# Patient Record
Sex: FEMALE | Race: WHITE | NOT HISPANIC OR LATINO | Employment: STUDENT | URBAN - METROPOLITAN AREA
[De-identification: names, ages, dates, MRNs, and addresses within clinical notes are randomized per-mention and may not be internally consistent; named-entity substitution may affect disease eponyms.]

---

## 2017-05-04 ENCOUNTER — PATIENT MESSAGE (OUTPATIENT)
Dept: INTERNAL MEDICINE | Facility: CLINIC | Age: 22
End: 2017-05-04

## 2017-05-08 ENCOUNTER — TELEPHONE (OUTPATIENT)
Dept: INTERNAL MEDICINE | Facility: CLINIC | Age: 22
End: 2017-05-08

## 2017-05-08 NOTE — TELEPHONE ENCOUNTER
Lm for pt to call back, pt booked apt for 5/22/17      Existing Patient      I have been having headaches that are accompanied with      spotty vision, tingly hands, and nausea     Called to offer sooner available time.

## 2017-05-22 ENCOUNTER — HOSPITAL ENCOUNTER (OUTPATIENT)
Dept: RADIOLOGY | Facility: HOSPITAL | Age: 22
Discharge: HOME | End: 2017-05-22
Attending: INTERNAL MEDICINE
Payer: COMMERCIAL

## 2017-05-22 ENCOUNTER — OFFICE VISIT (OUTPATIENT)
Dept: INTERNAL MEDICINE | Facility: CLINIC | Age: 22
End: 2017-05-22
Payer: COMMERCIAL

## 2017-05-22 VITALS
HEIGHT: 62 IN | HEART RATE: 90 BPM | SYSTOLIC BLOOD PRESSURE: 108 MMHG | DIASTOLIC BLOOD PRESSURE: 73 MMHG | RESPIRATION RATE: 16 BRPM | TEMPERATURE: 99 F | WEIGHT: 168.19 LBS | BODY MASS INDEX: 30.95 KG/M2

## 2017-05-22 DIAGNOSIS — M54.2 NECK PAIN: ICD-10-CM

## 2017-05-22 DIAGNOSIS — R51.9 NONINTRACTABLE HEADACHE, UNSPECIFIED CHRONICITY PATTERN, UNSPECIFIED HEADACHE TYPE: Primary | ICD-10-CM

## 2017-05-22 DIAGNOSIS — R20.0 NUMBNESS AND TINGLING IN BOTH HANDS: ICD-10-CM

## 2017-05-22 DIAGNOSIS — R51.9 NONINTRACTABLE HEADACHE, UNSPECIFIED CHRONICITY PATTERN, UNSPECIFIED HEADACHE TYPE: ICD-10-CM

## 2017-05-22 DIAGNOSIS — H53.9 VISUAL DISTURBANCE: ICD-10-CM

## 2017-05-22 DIAGNOSIS — R20.2 NUMBNESS AND TINGLING IN BOTH HANDS: ICD-10-CM

## 2017-05-22 PROCEDURE — 72040 X-RAY EXAM NECK SPINE 2-3 VW: CPT | Mod: 26,,, | Performed by: RADIOLOGY

## 2017-05-22 PROCEDURE — 99999 PR PBB SHADOW E&M-EST. PATIENT-LVL IV: CPT | Mod: PBBFAC,,, | Performed by: INTERNAL MEDICINE

## 2017-05-22 PROCEDURE — 1160F RVW MEDS BY RX/DR IN RCRD: CPT | Mod: S$GLB,,, | Performed by: INTERNAL MEDICINE

## 2017-05-22 PROCEDURE — 99214 OFFICE O/P EST MOD 30 MIN: CPT | Mod: S$GLB,,, | Performed by: INTERNAL MEDICINE

## 2017-05-22 PROCEDURE — 72040 X-RAY EXAM NECK SPINE 2-3 VW: CPT | Mod: TC,PO

## 2017-05-22 RX ORDER — SUMATRIPTAN SUCCINATE 100 MG/1
100 TABLET ORAL
Qty: 18 TABLET | Refills: 11 | Status: SHIPPED | OUTPATIENT
Start: 2017-05-22 | End: 2017-08-22

## 2017-05-22 NOTE — PROGRESS NOTES
Subjective:       Patient ID: Yulissa Carias is a 21 y.o. female.    Chief Complaint: Headache; Numbness (in fingers); Other (spotty vision); and Nausea    Patient is a 21 y.o.female who presents today for a few concerning symptoms. She has been experiencing occasional headaches associated with spotty vision, tingling in her hands, and nausea.     First episode occurred in October in the morning and continued to the afternoon. She took a nap and it resolved. She was having trouble seeing; spotty vision and hard to focus. Then, her hands started going numb and then she got a headache. Then she felt weak and nauseated. She had a hard time texting her dad. She vomited once. Her symptoms fulled resolved after her nap.    Next one was in march. She started getting the same symptoms of spotty vision and numbness in her hands. She went straight to sleep and symptoms resolved.     The last one was beginning of May. It started in the afternoon with the same symptoms.     Her headache is mostly right sided and in her neck.     Review of Systems   Constitutional: Negative for unexpected weight change.   HENT: Negative for hearing loss, rhinorrhea and trouble swallowing.    Eyes: Positive for visual disturbance. Negative for discharge.   Respiratory: Negative for chest tightness and wheezing.    Cardiovascular: Negative for chest pain and palpitations.   Gastrointestinal: Positive for constipation and diarrhea. Negative for blood in stool and vomiting.   Endocrine: Negative for polydipsia and polyuria.   Genitourinary: Negative for difficulty urinating, dysuria, hematuria and menstrual problem.   Musculoskeletal: Negative for arthralgias and joint swelling.   Neurological: Positive for numbness and headaches. Negative for weakness.   Psychiatric/Behavioral: Negative for confusion and dysphoric mood.       Objective:      Physical Exam   Constitutional: She is oriented to person, place, and time. She appears well-developed  and well-nourished. No distress.   HENT:   Head: Normocephalic and atraumatic.   Right Ear: External ear normal.   Left Ear: External ear normal.   Eyes: Conjunctivae and EOM are normal. Pupils are equal, round, and reactive to light. Right eye exhibits no discharge. Left eye exhibits no discharge. No scleral icterus.   Neck: Normal range of motion. Neck supple. No JVD present. No thyromegaly present.   Cardiovascular: Normal rate, regular rhythm, normal heart sounds and intact distal pulses.  Exam reveals no gallop and no friction rub.    No murmur heard.  Pulmonary/Chest: Effort normal and breath sounds normal. No stridor. No respiratory distress. She has no wheezes. She has no rales. She exhibits no tenderness.   Abdominal: Soft. Bowel sounds are normal. She exhibits no distension. There is no tenderness. There is no rebound.   Musculoskeletal: Normal range of motion. She exhibits no edema or tenderness.   Lymphadenopathy:     She has no cervical adenopathy.   Neurological: She is alert and oriented to person, place, and time. No cranial nerve deficit.   Skin: Skin is warm and dry. No rash noted. She is not diaphoretic. No erythema.   Psychiatric: She has a normal mood and affect. Her behavior is normal.   Nursing note and vitals reviewed.      Assessment and Plan:       1. Nonintractable headache, unspecified chronicity pattern, unspecified headache type  - ocular migraine vs MS  - Ambulatory consult to Neurology  - MRI Brain W WO Contrast; Future  - X-Ray Cervical Spine AP And Lateral; Future  - Vitamin B12; Future  - Sedimentation rate, manual; Future  - High sensitivity CRP (Cardiac CRP); Future  - sumatriptan (IMITREX) 100 MG tablet; Take 1 tablet (100 mg total) by mouth every 2 (two) hours as needed for Migraine. No more than twice in a 24 hour period  Dispense: 18 tablet; Refill: 11    2. Numbness and tingling in both hands  - Ambulatory consult to Neurology  - MRI Brain W WO Contrast; Future  - X-Ray  Cervical Spine AP And Lateral; Future  - Vitamin B12; Future  - Sedimentation rate, manual; Future  - High sensitivity CRP (Cardiac CRP); Future    3. Neck pain  - Ambulatory consult to Neurology  - MRI Brain W WO Contrast; Future  - X-Ray Cervical Spine AP And Lateral; Future  - Vitamin B12; Future  - Sedimentation rate, manual; Future  - High sensitivity CRP (Cardiac CRP); Future    4. Visual disturbance  - Ambulatory consult to Neurology  - MRI Brain W WO Contrast; Future  - X-Ray Cervical Spine AP And Lateral; Future  - Vitamin B12; Future  - Sedimentation rate, manual; Future  - High sensitivity CRP (Cardiac CRP); Future    - pt declines pregnancy screen prior to xray  - etiology likely migraine vs MS  - Notify clinic if symptoms change, worsen or do not improve          No Follow-up on file.

## 2017-06-01 ENCOUNTER — HOSPITAL ENCOUNTER (OUTPATIENT)
Dept: RADIOLOGY | Facility: HOSPITAL | Age: 22
Discharge: HOME | End: 2017-06-01
Attending: INTERNAL MEDICINE
Payer: COMMERCIAL

## 2017-06-01 DIAGNOSIS — M54.2 NECK PAIN: ICD-10-CM

## 2017-06-01 DIAGNOSIS — R20.0 NUMBNESS AND TINGLING IN BOTH HANDS: ICD-10-CM

## 2017-06-01 DIAGNOSIS — R20.2 NUMBNESS AND TINGLING IN BOTH HANDS: ICD-10-CM

## 2017-06-01 DIAGNOSIS — R51.9 NONINTRACTABLE HEADACHE, UNSPECIFIED CHRONICITY PATTERN, UNSPECIFIED HEADACHE TYPE: ICD-10-CM

## 2017-06-01 DIAGNOSIS — H53.9 VISUAL DISTURBANCE: ICD-10-CM

## 2017-06-01 PROCEDURE — 70553 MRI BRAIN STEM W/O & W/DYE: CPT | Mod: 26,,, | Performed by: RADIOLOGY

## 2017-06-01 PROCEDURE — 25500020 PHARM REV CODE 255: Performed by: INTERNAL MEDICINE

## 2017-06-01 PROCEDURE — 70553 MRI BRAIN STEM W/O & W/DYE: CPT | Mod: TC

## 2017-06-01 PROCEDURE — A9585 GADOBUTROL INJECTION: HCPCS | Performed by: INTERNAL MEDICINE

## 2017-06-01 RX ORDER — GADOBUTROL 604.72 MG/ML
8 INJECTION INTRAVENOUS
Status: COMPLETED | OUTPATIENT
Start: 2017-06-01 | End: 2017-06-01

## 2017-06-01 RX ADMIN — GADOBUTROL 8 ML: 604.72 INJECTION INTRAVENOUS at 07:06

## 2017-06-29 ENCOUNTER — OFFICE VISIT (OUTPATIENT)
Dept: NEUROLOGY | Facility: CLINIC | Age: 22
End: 2017-06-29
Payer: COMMERCIAL

## 2017-06-29 VITALS
WEIGHT: 171.06 LBS | HEART RATE: 95 BPM | BODY MASS INDEX: 31.48 KG/M2 | DIASTOLIC BLOOD PRESSURE: 81 MMHG | SYSTOLIC BLOOD PRESSURE: 126 MMHG | HEIGHT: 62 IN

## 2017-06-29 DIAGNOSIS — G43.109 MIGRAINE WITH AURA AND WITHOUT STATUS MIGRAINOSUS, NOT INTRACTABLE: ICD-10-CM

## 2017-06-29 PROCEDURE — 99999 PR PBB SHADOW E&M-EST. PATIENT-LVL III: CPT | Mod: PBBFAC,,, | Performed by: PSYCHIATRY & NEUROLOGY

## 2017-06-29 PROCEDURE — 99214 OFFICE O/P EST MOD 30 MIN: CPT | Mod: S$GLB,,, | Performed by: PSYCHIATRY & NEUROLOGY

## 2017-06-29 NOTE — PROGRESS NOTES
[facility] - NEUROLOGY  [facility] [place]    Date: June 29, 2017   Patient Name: Yulissa Carias   MRN: [id number]   PCP: Dorothea Rand  Referring Provider: Dorothea Rand,     Assessment:      This is Yulissa Carias, 21 y.o. female with recent onset of migraines with aura that are infrequent enough that she does not require preventative medication at this time.  Images of recent MRI brain and lab work reviewed.  We discussed the diagnosis and she was encouraged to use imitrex if headaches recur.  She was advised to call or return to clinic if they become more frequent as she may require preventative medication in the future.     Plan:      -  Imitrex prn    Follow up as needed       I discussed side effects of the medications. I asked the patient to  stop the medication if she notices serious adverse effects as we discussed and to seek immediate medical attention at an ER.     Zack Morton MD  [facility]   Department of Neurology    Subjective:      HPI:   Ms. Yulissa Carias is a 21 y.o. female who presents with a chief complaint of migraines    Starting fall 2016 she began having occasional headaches with prominent nausea, vomiting, light sensitivity, paresthesias, and spots in her vision.  She estimates this happens about once a month.  She was recently prescribed imitrex but has not had a headache since receiving this medication.  No family history of migraine.      PAST MEDICAL HISTORY:  Past Medical History:   Diagnosis Date    Allergy     Seasonal     Asthma     Biliary dyskinesia Jan 2014    lap rubio 1/10/14 Dr. Andrew FERRARA       PAST SURGICAL HISTORY:  Past Surgical History:   Procedure Laterality Date    CHOLECYSTECTOMY      lap rubio  1/10/14    Dr. Andrew FERRARA - Biliary dyskinesia/chronic cholecystitis       CURRENT MEDS:  Current Outpatient Prescriptions   Medication Sig Dispense Refill    fluticasone (FLONASE) 50 mcg/actuation nasal  "spray 1 spray by Nasal route once daily. 16 g 3    loratadine (CLARITIN) 10 mg tablet Take 10 mg by mouth once daily.        MICROGESTIN FE 1/20, 28, 1 mg-20 mcg (21)/75 mg (7) per tablet TAKE 1 TABLET BY MOUTH DAILY 84 tablet 0    multivitamin capsule Take 1 capsule by mouth once daily.      sumatriptan (IMITREX) 100 MG tablet Take 1 tablet (100 mg total) by mouth every 2 (two) hours as needed for Migraine. No more than twice in a 24 hour period 18 tablet 11     No current facility-administered medications for this visit.        ALLERGIES:  Review of patient's allergies indicates:  No Known Allergies    FAMILY HISTORY:  Family History   Problem Relation Age of Onset    Hypertension Mother     Diabetes Mother      prediabetes    Hyperlipidemia Maternal Grandmother     Hypertension Paternal Grandfather     Celiac disease Maternal Aunt     Acne Neg Hx     Melanoma Neg Hx     Psoriasis Neg Hx     Lupus Neg Hx     Eczema Neg Hx     Breast cancer Neg Hx     Colon cancer Neg Hx     Ovarian cancer Neg Hx        SOCIAL HISTORY:  Social History   Substance Use Topics    Smoking status: Never Smoker    Smokeless tobacco: Never Used    Alcohol use No       Review of Systems:  12 review of systems is negative except for the symptoms mentioned in HPI.        Objective:     Vitals:    06/29/17 1503   BP: 126/81   Pulse: 95   Weight: 77.6 kg (171 lb 1.2 oz)   Height: 5' 2" (1.575 m)       General: NAD, well nourished   Eyes: no tearing, discharge, no erythema   ENT: moist mucous membranes of the oral cavity, nares patent    Neck: Supple, full range of motion  Cardiovascular: Warm and well perfused, pulses equal and symmetrical  Lungs: Normal work of breathing, normal chest wall excursions  Skin: No rash, lesions, or breakdown on exposed skin  Psychiatry: Mood and affect are appropriate   Abdomen: soft, non tender, non distended  Extremeties: No cyanosis, clubbing or edema.    Neurological   MENTAL STATUS: Alert " and oriented to person, place, and time. Attention and concentration within normal limits. Speech without dysarthria, able to name and repeat without difficulty. Recent and remote memory within normal limits   CRANIAL NERVES: Visual fields intact. PERRL. EOMI. Facial sensation intact. Face symmetrical. Hearing grossly intact. Full shoulder shrug bilaterally. Tongue protrudes midline   SENSORY: Sensation is intact to light touch throughout. Negative Romberg.   MOTOR: Normal bulk and tone. No pronator drift.  5/5 deltoid, biceps, triceps, interosseous, hand  bilaterally. 5/5 iliopsoas, knee extension/flexion, foot dorsi/plantarflexion bilaterally.    REFLEXES: Symmetric and 2+ throughout. Toes down going bilaterally.   CEREBELLAR/COORDINATION/GAIT: Gait steady with normal arm swing and stride length.  Heel to shin intact. Finger to nose intact. Normal rapid alternating movements.

## 2017-06-29 NOTE — LETTER
June 29, 2017      Dorothea Rand, DO  [address] [facility] [place] [address]           [facility]  [address]  [place] [address]  Phone: [phone]  Fax: [phone]          Patient: Yulissa Carias   MR Number: [id number]   YOB: 1995   Date of Visit: 6/29/2017       Dear Dr. Dorothea Rand:    Thank you for referring Yulissa Carias to me for evaluation. Attached you will find relevant portions of my assessment and plan of care.    If you have questions, please do not hesitate to call me. I look forward to following Yulissa Carias along with you.    Sincerely,    Zack Morton MD    Enclosure  CC:  No Recipients    If you would like to receive this communication electronically, please contact [email or web address] or [phone] to request more information on [employer] access.    For providers and/or their staff who would like to refer a patient to [facility], please contact us through our one-stop-shop provider referral line, [facility] [place], at [phone].    If you feel you have received this communication in error or would no longer like to receive these types of communications, please e-mail [email or web address]

## 2017-08-02 ENCOUNTER — OFFICE VISIT (OUTPATIENT)
Dept: INTERNAL MEDICINE | Facility: CLINIC | Age: 22
End: 2017-08-02
Payer: COMMERCIAL

## 2017-08-02 VITALS
OXYGEN SATURATION: 99 % | HEART RATE: 109 BPM | SYSTOLIC BLOOD PRESSURE: 118 MMHG | TEMPERATURE: 99 F | DIASTOLIC BLOOD PRESSURE: 76 MMHG | BODY MASS INDEX: 31.48 KG/M2 | RESPIRATION RATE: 16 BRPM | HEIGHT: 62 IN | WEIGHT: 171.06 LBS

## 2017-08-02 DIAGNOSIS — Z00.00 ANNUAL PHYSICAL EXAM: Primary | ICD-10-CM

## 2017-08-02 DIAGNOSIS — K58.9 IRRITABLE BOWEL SYNDROME, UNSPECIFIED TYPE: ICD-10-CM

## 2017-08-02 PROCEDURE — 99395 PREV VISIT EST AGE 18-39: CPT | Mod: S$GLB,,, | Performed by: INTERNAL MEDICINE

## 2017-08-02 PROCEDURE — 99999 PR PBB SHADOW E&M-EST. PATIENT-LVL III: CPT | Mod: PBBFAC,,, | Performed by: INTERNAL MEDICINE

## 2017-08-02 RX ORDER — AMOXICILLIN 500 MG/1
CAPSULE ORAL
Refills: 0 | COMMUNITY
Start: 2017-07-22 | End: 2017-08-22

## 2017-08-02 RX ORDER — UBIDECARENONE 75 MG
500 CAPSULE ORAL DAILY
COMMUNITY

## 2017-08-02 RX ORDER — DICYCLOMINE HYDROCHLORIDE 10 MG/1
10 CAPSULE ORAL 3 TIMES DAILY
Qty: 120 CAPSULE | Refills: 3 | Status: SHIPPED | OUTPATIENT
Start: 2017-08-02 | End: 2018-10-14 | Stop reason: SDUPTHER

## 2017-08-09 ENCOUNTER — LAB VISIT (OUTPATIENT)
Dept: LAB | Facility: HOSPITAL | Age: 22
End: 2017-08-09
Attending: INTERNAL MEDICINE
Payer: COMMERCIAL

## 2017-08-09 DIAGNOSIS — Z00.00 ANNUAL PHYSICAL EXAM: ICD-10-CM

## 2017-08-09 LAB
25(OH)D3+25(OH)D2 SERPL-MCNC: 45 NG/ML
ALBUMIN SERPL BCP-MCNC: 3.2 G/DL
ALP SERPL-CCNC: 81 U/L
ALT SERPL W/O P-5'-P-CCNC: 20 U/L
ANION GAP SERPL CALC-SCNC: 12 MMOL/L
AST SERPL-CCNC: 19 U/L
BASOPHILS # BLD AUTO: ABNORMAL K/UL
BASOPHILS NFR BLD: 1 %
BILIRUB SERPL-MCNC: 0.3 MG/DL
BUN SERPL-MCNC: 9 MG/DL
CALCIUM SERPL-MCNC: 9.1 MG/DL
CHLORIDE SERPL-SCNC: 108 MMOL/L
CHOLEST/HDLC SERPL: 5.1 {RATIO}
CO2 SERPL-SCNC: 20 MMOL/L
CREAT SERPL-MCNC: 0.8 MG/DL
DIFFERENTIAL METHOD: ABNORMAL
EOSINOPHIL # BLD AUTO: ABNORMAL K/UL
EOSINOPHIL NFR BLD: 1 %
ERYTHROCYTE [DISTWIDTH] IN BLOOD BY AUTOMATED COUNT: 13.6 %
EST. GFR  (AFRICAN AMERICAN): >60 ML/MIN/1.73 M^2
EST. GFR  (NON AFRICAN AMERICAN): >60 ML/MIN/1.73 M^2
GLUCOSE SERPL-MCNC: 93 MG/DL
HCT VFR BLD AUTO: 38.7 %
HDL/CHOLESTEROL RATIO: 19.5 %
HDLC SERPL-MCNC: 226 MG/DL
HDLC SERPL-MCNC: 44 MG/DL
HGB BLD-MCNC: 13.1 G/DL
LDLC SERPL CALC-MCNC: 144.8 MG/DL
LYMPHOCYTES # BLD AUTO: ABNORMAL K/UL
LYMPHOCYTES NFR BLD: 48 %
MCH RBC QN AUTO: 28.4 PG
MCHC RBC AUTO-ENTMCNC: 33.9 G/DL
MCV RBC AUTO: 84 FL
MONOCYTES # BLD AUTO: ABNORMAL K/UL
MONOCYTES NFR BLD: 8 %
NEUTROPHILS NFR BLD: 42 %
NONHDLC SERPL-MCNC: 182 MG/DL
PLATELET # BLD AUTO: 368 K/UL
PMV BLD AUTO: 10.3 FL
POTASSIUM SERPL-SCNC: 4.2 MMOL/L
PROT SERPL-MCNC: 7 G/DL
RBC # BLD AUTO: 4.62 M/UL
SODIUM SERPL-SCNC: 140 MMOL/L
TRIGL SERPL-MCNC: 186 MG/DL
TSH SERPL DL<=0.005 MIU/L-ACNC: 1.6 UIU/ML
WBC # BLD AUTO: 8.4 K/UL

## 2017-08-09 PROCEDURE — 80061 LIPID PANEL: CPT

## 2017-08-09 PROCEDURE — 36415 COLL VENOUS BLD VENIPUNCTURE: CPT | Mod: PO

## 2017-08-09 PROCEDURE — 85007 BL SMEAR W/DIFF WBC COUNT: CPT

## 2017-08-09 PROCEDURE — 82306 VITAMIN D 25 HYDROXY: CPT

## 2017-08-09 PROCEDURE — 85027 COMPLETE CBC AUTOMATED: CPT

## 2017-08-09 PROCEDURE — 84443 ASSAY THYROID STIM HORMONE: CPT

## 2017-08-09 PROCEDURE — 80053 COMPREHEN METABOLIC PANEL: CPT

## 2017-08-22 ENCOUNTER — OFFICE VISIT (OUTPATIENT)
Dept: OBSTETRICS AND GYNECOLOGY | Facility: CLINIC | Age: 22
End: 2017-08-22
Payer: COMMERCIAL

## 2017-08-22 VITALS
DIASTOLIC BLOOD PRESSURE: 78 MMHG | SYSTOLIC BLOOD PRESSURE: 122 MMHG | WEIGHT: 172.19 LBS | HEIGHT: 62 IN | BODY MASS INDEX: 31.68 KG/M2

## 2017-08-22 DIAGNOSIS — Z12.4 ENCOUNTER FOR PAPANICOLAOU SMEAR FOR CERVICAL CANCER SCREENING: ICD-10-CM

## 2017-08-22 DIAGNOSIS — N94.6 DYSMENORRHEA: ICD-10-CM

## 2017-08-22 DIAGNOSIS — Z01.419 WOMEN'S ANNUAL ROUTINE GYNECOLOGICAL EXAMINATION: Primary | ICD-10-CM

## 2017-08-22 PROCEDURE — 88175 CYTOPATH C/V AUTO FLUID REDO: CPT

## 2017-08-22 PROCEDURE — 99395 PREV VISIT EST AGE 18-39: CPT | Mod: S$GLB,,, | Performed by: NURSE PRACTITIONER

## 2017-08-22 PROCEDURE — 99999 PR PBB SHADOW E&M-EST. PATIENT-LVL III: CPT | Mod: PBBFAC,,, | Performed by: NURSE PRACTITIONER

## 2017-08-22 RX ORDER — NORETHINDRONE ACETATE AND ETHINYL ESTRADIOL 1MG-20(21)
1 KIT ORAL DAILY
Qty: 84 TABLET | Refills: 3 | Status: SHIPPED | OUTPATIENT
Start: 2017-08-22 | End: 2018-07-09 | Stop reason: SDUPTHER

## 2017-08-22 NOTE — PROGRESS NOTES
CC: Annual  HPI: Pt is a 22 y.o.  female who presents for routine annual exam. She uses OCPs  For cycle control.  She is not sexually active has never been.  Denies any GYN complaints.  She is s/p the HPV vaccine series.   ROS:  GENERAL: Feeling well overall. Denies fever or chills.   SKIN: Denies rash or lesions.   HEAD: Denies head injury or headache.   NODES: Denies enlarged lymph nodes.   CHEST: Denies chest pain or shortness of breath.   CARDIOVASCULAR: Denies palpitations or left sided chest pain.   ABDOMEN: No abdominal pain, constipation, diarrhea, nausea, vomiting or rectal bleeding.   URINARY: No dysuria, hematuria, or burning on urination.  REPRODUCTIVE: See HPI.   BREASTS: Denies pain, lumps, or nipple discharge.   HEMATOLOGIC: No easy bruisability or excessive bleeding.   MUSCULOSKELETAL: Denies joint pain or swelling.   NEUROLOGIC: Denies syncope or weakness.   PSYCHIATRIC: Denies depression, anxiety or mood swings.    PE:   APPEARANCE: Well nourished, well developed, White female in no acute distress.  NODES: no cervical, supraclavicular, or inguinal lymphadenopathy  BREASTS: Symmetrical, no skin changes or visible lesions. No palpable masses, nipple discharge or adenopathy bilaterally.  ABDOMEN: Soft. No tenderness or masses. No distention. No hernias palpated. No CVA tenderness.  VULVA: No lesions. Normal external female genitalia.  URETHRAL MEATUS: Normal size and location, no lesions, no prolapse.  URETHRA: No masses, tenderness, or prolapse.  VAGINA: Moist. No lesions or lacerations noted. No abnormal discharge present. No odor present.   CERVIX: No lesions or discharge. No cervical motion tenderness.   UTERUS: Normal size, regular shape, mobile, non-tender.  ADNEXA: No tenderness. No fullness or masses palpated in the adnexal regions.   ANUS PERINEUM: Normal.      Diagnosis:  1. Women's annual routine gynecological examination    2. Encounter for Papanicolaou smear for cervical cancer screening     3. Dysmenorrhea        Plan:   Pap smear  OCPs refilled    Orders Placed This Encounter    Liquid-based pap smear, screening    norethindrone-ethinyl estradiol (MICROGESTIN FE 1/20, 28,) 1 mg-20 mcg (21)/75 mg (7) per tablet       Patient was counseled today on the new ACS guidelines for cervical cytology screening as well as the current recommendations for breast cancer screening. She was counseled to follow up with her PCP for other routine health maintenance. Counseling session lasted approximately 10 minutes, and all her questions were answered.    Follow-up with me in 1 year for routine exam    Deb Ramos, ROSAURA-QUIANA

## 2018-07-09 DIAGNOSIS — N94.6 DYSMENORRHEA: ICD-10-CM

## 2018-07-09 RX ORDER — NORETHINDRONE ACETATE AND ETHINYL ESTRADIOL AND FERROUS FUMARATE 1MG-20(21)
KIT ORAL
Qty: 84 TABLET | Refills: 0 | Status: SHIPPED | OUTPATIENT
Start: 2018-07-09 | End: 2018-09-28 | Stop reason: SDUPTHER

## 2018-08-03 ENCOUNTER — LAB VISIT (OUTPATIENT)
Dept: LAB | Facility: HOSPITAL | Age: 23
End: 2018-08-03
Attending: INTERNAL MEDICINE
Payer: COMMERCIAL

## 2018-08-03 ENCOUNTER — OFFICE VISIT (OUTPATIENT)
Dept: INTERNAL MEDICINE | Facility: CLINIC | Age: 23
End: 2018-08-03
Payer: COMMERCIAL

## 2018-08-03 VITALS
DIASTOLIC BLOOD PRESSURE: 54 MMHG | TEMPERATURE: 99 F | SYSTOLIC BLOOD PRESSURE: 104 MMHG | RESPIRATION RATE: 18 BRPM | OXYGEN SATURATION: 99 % | HEIGHT: 62 IN | HEART RATE: 76 BPM | WEIGHT: 183 LBS | BODY MASS INDEX: 33.68 KG/M2

## 2018-08-03 DIAGNOSIS — Z23 NEED FOR TD VACCINE: ICD-10-CM

## 2018-08-03 DIAGNOSIS — Z00.00 ANNUAL PHYSICAL EXAM: ICD-10-CM

## 2018-08-03 DIAGNOSIS — Z91.09 ENVIRONMENTAL ALLERGIES: ICD-10-CM

## 2018-08-03 DIAGNOSIS — Z00.00 ANNUAL PHYSICAL EXAM: Primary | ICD-10-CM

## 2018-08-03 DIAGNOSIS — J32.4 CHRONIC PANSINUSITIS: ICD-10-CM

## 2018-08-03 LAB
ALBUMIN SERPL BCP-MCNC: 3.4 G/DL
ALP SERPL-CCNC: 77 U/L
ALT SERPL W/O P-5'-P-CCNC: 19 U/L
ANION GAP SERPL CALC-SCNC: 8 MMOL/L
AST SERPL-CCNC: 22 U/L
BASOPHILS # BLD AUTO: 0.04 K/UL
BASOPHILS NFR BLD: 0.5 %
BILIRUB SERPL-MCNC: 0.6 MG/DL
BUN SERPL-MCNC: 8 MG/DL
CALCIUM SERPL-MCNC: 9.5 MG/DL
CHLORIDE SERPL-SCNC: 106 MMOL/L
CHOLEST SERPL-MCNC: 192 MG/DL
CHOLEST/HDLC SERPL: 4.1 {RATIO}
CO2 SERPL-SCNC: 23 MMOL/L
CREAT SERPL-MCNC: 0.8 MG/DL
DIFFERENTIAL METHOD: ABNORMAL
EOSINOPHIL # BLD AUTO: 0.1 K/UL
EOSINOPHIL NFR BLD: 0.6 %
ERYTHROCYTE [DISTWIDTH] IN BLOOD BY AUTOMATED COUNT: 14.1 %
EST. GFR  (AFRICAN AMERICAN): >60 ML/MIN/1.73 M^2
EST. GFR  (NON AFRICAN AMERICAN): >60 ML/MIN/1.73 M^2
ESTIMATED AVG GLUCOSE: 103 MG/DL
GLUCOSE SERPL-MCNC: 130 MG/DL
HBA1C MFR BLD HPLC: 5.2 %
HCT VFR BLD AUTO: 40.3 %
HDLC SERPL-MCNC: 47 MG/DL
HDLC SERPL: 24.5 %
HGB BLD-MCNC: 12.8 G/DL
IMM GRANULOCYTES # BLD AUTO: 0.02 K/UL
IMM GRANULOCYTES NFR BLD AUTO: 0.2 %
LDLC SERPL CALC-MCNC: 108.6 MG/DL
LYMPHOCYTES # BLD AUTO: 3.1 K/UL
LYMPHOCYTES NFR BLD: 35.1 %
MCH RBC QN AUTO: 27.6 PG
MCHC RBC AUTO-ENTMCNC: 31.8 G/DL
MCV RBC AUTO: 87 FL
MONOCYTES # BLD AUTO: 0.5 K/UL
MONOCYTES NFR BLD: 5.3 %
NEUTROPHILS # BLD AUTO: 5.1 K/UL
NEUTROPHILS NFR BLD: 58.3 %
NONHDLC SERPL-MCNC: 145 MG/DL
NRBC BLD-RTO: 0 /100 WBC
PLATELET # BLD AUTO: 434 K/UL
PMV BLD AUTO: 10.5 FL
POTASSIUM SERPL-SCNC: 4 MMOL/L
PROT SERPL-MCNC: 7.1 G/DL
RBC # BLD AUTO: 4.64 M/UL
SODIUM SERPL-SCNC: 137 MMOL/L
TRIGL SERPL-MCNC: 182 MG/DL
TSH SERPL DL<=0.005 MIU/L-ACNC: 1.05 UIU/ML
WBC # BLD AUTO: 8.81 K/UL

## 2018-08-03 PROCEDURE — 99999 PR PBB SHADOW E&M-EST. PATIENT-LVL III: CPT | Mod: PBBFAC,,, | Performed by: INTERNAL MEDICINE

## 2018-08-03 PROCEDURE — 36415 COLL VENOUS BLD VENIPUNCTURE: CPT | Mod: PO

## 2018-08-03 PROCEDURE — 90714 TD VACC NO PRESV 7 YRS+ IM: CPT | Mod: S$GLB,,, | Performed by: INTERNAL MEDICINE

## 2018-08-03 PROCEDURE — 80061 LIPID PANEL: CPT

## 2018-08-03 PROCEDURE — 80053 COMPREHEN METABOLIC PANEL: CPT

## 2018-08-03 PROCEDURE — 83036 HEMOGLOBIN GLYCOSYLATED A1C: CPT

## 2018-08-03 PROCEDURE — 85025 COMPLETE CBC W/AUTO DIFF WBC: CPT

## 2018-08-03 PROCEDURE — 84443 ASSAY THYROID STIM HORMONE: CPT

## 2018-08-03 PROCEDURE — 99395 PREV VISIT EST AGE 18-39: CPT | Mod: 25,S$GLB,, | Performed by: INTERNAL MEDICINE

## 2018-08-03 PROCEDURE — 90471 IMMUNIZATION ADMIN: CPT | Mod: S$GLB,,, | Performed by: INTERNAL MEDICINE

## 2018-08-03 NOTE — PROGRESS NOTES
Subjective:       Patient ID: Yulissa Carias is a 23 y.o. female.    Chief Complaint: Annual Exam    HPI    23 y.o. female here for annual exam.     Health Maintenance:   Lipid disorders/ASCVD risk: due    DM: A1c due  Sexual/ STD Screening: no concerns  Cervical Cancer (21-65y):  08/2017      Vaccines:   Influenza (yearly)    Tetanus (every 10 yrs - 1st tdap) due     HPV: completed series [id number]      Medical Problems:  1. Chronic sinusitis, environmental allergies: takes claritin daily (for many years), flonase daily. She continues to have post nasal drip and sinus pressure. She takes sudafed prn congestion.         Past Medical History:   Diagnosis Date    Allergy     Seasonal     Asthma     Biliary dyskinesia Jan 2014    lap rubio 1/10/14 Dr. Andrew FERRARA    IBS (irritable bowel syndrome) 2017     Past Surgical History:   Procedure Laterality Date    CHOLECYSTECTOMY      lap rubio  1/10/14    Dr. Andrew FERRARA - Biliary dyskinesia/chronic cholecystitis     Family History   Problem Relation Age of Onset    Hypertension Mother     Diabetes Mother         prediabetes    Hyperlipidemia Maternal Grandmother     Hypertension Paternal Grandfather     Celiac disease Maternal Aunt     Sinus disease Father     Anxiety disorder Father     Acne Neg Hx     Melanoma Neg Hx     Psoriasis Neg Hx     Lupus Neg Hx     Eczema Neg Hx     Breast cancer Neg Hx     Colon cancer Neg Hx     Ovarian cancer Neg Hx      Social History     Social History    Marital status: Single     Spouse name: N/A    Number of children: N/A    Years of education: N/A     Occupational History    Student       [employer]     Social History Main Topics    Smoking status: Never Smoker    Smokeless tobacco: Never Used    Alcohol use No      Comment: occasionally    Drug use: No    Sexual activity: No     Other Topics Concern    Are You Pregnant Or Think You May Be? No    Breast-Feeding No     Social History Narrative  "   Goes to [employer] for [employer]             Review of patient's allergies indicates:  No Known Allergies    Current Outpatient Prescriptions:     cyanocobalamin (VITAMIN B-12) 500 MCG tablet, Take 500 mcg by mouth once daily., Disp: , Rfl:     dicyclomine (BENTYL) 10 MG capsule, Take 1 capsule (10 mg total) by mouth 3 (three) times daily., Disp: 120 capsule, Rfl: 3    fluticasone (FLONASE) 50 mcg/actuation nasal spray, 1 spray by Nasal route once daily., Disp: 16 g, Rfl: 3    JUNEL FE 1/20, 28, 1 mg-20 mcg (21)/75 mg (7) per tablet, TAKE 1 TABLET BY MOUTH EVERY DAY, Disp: 84 tablet, Rfl: 0    loratadine (CLARITIN) 10 mg tablet, Take 10 mg by mouth once daily.  , Disp: , Rfl:     multivitamin capsule, Take 1 capsule by mouth once daily., Disp: , Rfl:       Review of Systems   Constitutional: Negative for fever and unexpected weight change.   HENT: Positive for congestion, postnasal drip and sinus pressure. Negative for dental problem and trouble swallowing.    Eyes: Negative for discharge and redness.   Respiratory: Negative for cough and shortness of breath.    Cardiovascular: Negative for chest pain and leg swelling.   Gastrointestinal: Negative for abdominal pain, blood in stool, constipation and diarrhea.   Genitourinary: Negative for difficulty urinating, dysuria and frequency.   Musculoskeletal: Negative for gait problem and joint swelling.   Skin: Negative for pallor, rash and wound.   Allergic/Immunologic: Positive for environmental allergies.   Neurological: Negative for numbness and headaches.       Objective:        Vitals:    08/03/18 1402 08/03/18 1423   BP: (!) 104/54    Pulse: (!) 112 76   Resp: 18    Temp: 98.6 °F (37 °C)    SpO2: 99%    Weight: 83 kg (182 lb 15.7 oz)    Height: 5' 2" (1.575 m)        Body mass index is 33.47 kg/m².    Physical Exam   Constitutional: She is oriented to person, place, and time. She appears well-developed. No distress.   HENT:   Head: " Normocephalic and atraumatic.   Right Ear: Tympanic membrane normal.   Left Ear: Tympanic membrane normal.   Nose: Nose normal.   Mouth/Throat: Oropharynx is clear and moist.   Eyes: Conjunctivae and EOM are normal. Pupils are equal, round, and reactive to light.   Neck: Normal range of motion. Neck supple. No tracheal deviation present. No thyromegaly present.   Cardiovascular: Normal rate, regular rhythm, normal heart sounds and intact distal pulses.    Pulmonary/Chest: Effort normal and breath sounds normal.   Abdominal: Soft. Bowel sounds are normal. She exhibits no distension and no mass. There is no tenderness.   Musculoskeletal: Normal range of motion. She exhibits no edema.   Lymphadenopathy:     She has no cervical adenopathy.   Neurological: She is alert and oriented to person, place, and time. No sensory deficit.   Skin: Skin is warm and dry. She is not diaphoretic. No cyanosis. Nails show no clubbing.   Psychiatric: She has a normal mood and affect. Her behavior is normal. Judgment normal.       Assessment:     1. Annual physical exam    2. Need for Td vaccine    3. Chronic pansinusitis    4. Environmental allergies           Plan:         1. Annual physical exam  - CBC auto differential; Future  - Comprehensive metabolic panel; Future  - Hemoglobin A1c; Future  - Lipid panel; Future  - TSH; Future  - Urinalysis; Future    2. Need for Td vaccine  - (In Office Administered) Td Vaccine - Preservative Free    3. Chronic pansinusitis  - switch antihistamine yearly, recommend zyrtec  - continue flonase  - add nasal/ sinus saline rinse such as neti-pot or simply saline spray  - if no improvement with the above measures, can proceed w/ sinus imaging such as x-ray or CT    4. Environmental allergies  - see #3

## 2018-08-04 ENCOUNTER — PATIENT MESSAGE (OUTPATIENT)
Dept: INTERNAL MEDICINE | Facility: CLINIC | Age: 23
End: 2018-08-04

## 2018-09-28 DIAGNOSIS — N94.6 DYSMENORRHEA: ICD-10-CM

## 2018-09-28 RX ORDER — NORETHINDRONE ACETATE AND ETHINYL ESTRADIOL AND FERROUS FUMARATE 1MG-20(21)
KIT ORAL
Qty: 84 TABLET | Refills: 0 | Status: SHIPPED | OUTPATIENT
Start: 2018-09-28 | End: 2019-01-15 | Stop reason: SDUPTHER

## 2018-10-14 RX ORDER — DICYCLOMINE HYDROCHLORIDE 10 MG/1
CAPSULE ORAL
Qty: 120 CAPSULE | Refills: 0 | Status: SHIPPED | OUTPATIENT
Start: 2018-10-14 | End: 2019-07-19 | Stop reason: SDUPTHER

## 2018-12-14 ENCOUNTER — PATIENT MESSAGE (OUTPATIENT)
Dept: INTERNAL MEDICINE | Facility: CLINIC | Age: 23
End: 2018-12-14

## 2018-12-17 ENCOUNTER — PATIENT MESSAGE (OUTPATIENT)
Dept: INTERNAL MEDICINE | Facility: CLINIC | Age: 23
End: 2018-12-17

## 2019-01-15 DIAGNOSIS — N94.6 DYSMENORRHEA: ICD-10-CM

## 2019-01-15 RX ORDER — NORETHINDRONE ACETATE AND ETHINYL ESTRADIOL AND FERROUS FUMARATE 1MG-20(21)
KIT ORAL
Qty: 84 TABLET | Refills: 0 | Status: SHIPPED | OUTPATIENT
Start: 2019-01-15 | End: 2019-05-01 | Stop reason: SDUPTHER

## 2019-04-25 ENCOUNTER — PATIENT MESSAGE (OUTPATIENT)
Dept: INTERNAL MEDICINE | Facility: CLINIC | Age: 24
End: 2019-04-25

## 2019-05-01 DIAGNOSIS — N94.6 DYSMENORRHEA: ICD-10-CM

## 2019-05-01 RX ORDER — NORETHINDRONE ACETATE AND ETHINYL ESTRADIOL AND FERROUS FUMARATE 1MG-20(21)
KIT ORAL
Qty: 84 TABLET | Refills: 0 | Status: SHIPPED | OUTPATIENT
Start: 2019-05-01 | End: 2019-08-04 | Stop reason: SDUPTHER

## 2019-05-22 ENCOUNTER — OFFICE VISIT (OUTPATIENT)
Dept: INTERNAL MEDICINE | Facility: CLINIC | Age: 24
End: 2019-05-22
Payer: COMMERCIAL

## 2019-05-22 VITALS
TEMPERATURE: 99 F | HEART RATE: 98 BPM | BODY MASS INDEX: 33.26 KG/M2 | HEIGHT: 62 IN | SYSTOLIC BLOOD PRESSURE: 107 MMHG | WEIGHT: 180.75 LBS | DIASTOLIC BLOOD PRESSURE: 88 MMHG | RESPIRATION RATE: 16 BRPM

## 2019-05-22 DIAGNOSIS — Z02.89 ENCOUNTER FOR COMPLETION OF FORM WITH PATIENT: Primary | ICD-10-CM

## 2019-05-22 PROCEDURE — 99999 PR PBB SHADOW E&M-EST. PATIENT-LVL III: ICD-10-PCS | Mod: PBBFAC,,, | Performed by: INTERNAL MEDICINE

## 2019-05-22 PROCEDURE — 3008F PR BODY MASS INDEX (BMI) DOCUMENTED: ICD-10-PCS | Mod: CPTII,S$GLB,, | Performed by: INTERNAL MEDICINE

## 2019-05-22 PROCEDURE — 3008F BODY MASS INDEX DOCD: CPT | Mod: CPTII,S$GLB,, | Performed by: INTERNAL MEDICINE

## 2019-05-22 PROCEDURE — 99999 PR PBB SHADOW E&M-EST. PATIENT-LVL III: CPT | Mod: PBBFAC,,, | Performed by: INTERNAL MEDICINE

## 2019-05-22 PROCEDURE — 99213 PR OFFICE/OUTPT VISIT, EST, LEVL III, 20-29 MIN: ICD-10-PCS | Mod: S$GLB,,, | Performed by: INTERNAL MEDICINE

## 2019-05-22 PROCEDURE — 99213 OFFICE O/P EST LOW 20 MIN: CPT | Mod: S$GLB,,, | Performed by: INTERNAL MEDICINE

## 2019-05-22 NOTE — PROGRESS NOTES
"Subjective:       Patient ID: Yulissa Carias is a 23 y.o. female.    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 She will be starting at [employer] in the fall. She is utd on immunizations.     Review of Systems   Constitutional: Negative for activity change.   Eyes: Negative for discharge.   Respiratory: Negative for wheezing.    Cardiovascular: Negative for chest pain and palpitations.   Gastrointestinal: Negative for constipation, diarrhea and vomiting.   Genitourinary: Negative for difficulty urinating and hematuria.   Neurological: Negative for headaches.   Psychiatric/Behavioral: Positive for dysphoric mood.       Objective:        Vitals:    05/22/19 1446   BP: 107/88   Pulse: 98   Resp: 16   Temp: 98.5 °F (36.9 °C)   TempSrc: Oral   Weight: 82 kg (180 lb 12.4 oz)   Height: 5' 2" (1.575 m)       Body mass index is 33.06 kg/m².    Physical Exam   Constitutional: She is oriented to person, place, and time. She appears well-developed and well-nourished. No distress.   HENT:   Head: Normocephalic and atraumatic.   Right Ear: External ear normal.   Left Ear: External ear normal.   Eyes: Conjunctivae and EOM are normal.   Neck: Normal range of motion.   Cardiovascular: Normal rate and intact distal pulses.   Pulmonary/Chest: Effort normal.   Musculoskeletal: Normal range of motion.   Neurological: She is alert and oriented to person, place, and time.   Skin: Skin is warm and dry. No rash noted.   Psychiatric: She has a normal mood and affect. Her behavior is normal.       Assessment:     1. Encounter for completion of form with patient           Plan:         1. Encounter for completion of form with patient  - form completed and given to pt  - immunization record printed and given to pt           Patient note was created using MModal Dictation.  Any errors in syntax or even information may not have been identified and " edited on initial review prior to signing this note.

## 2019-06-03 ENCOUNTER — TELEPHONE (OUTPATIENT)
Dept: INTERNAL MEDICINE | Facility: CLINIC | Age: 24
End: 2019-06-03

## 2019-06-03 DIAGNOSIS — Z00.00 ANNUAL PHYSICAL EXAM: Primary | ICD-10-CM

## 2019-07-20 RX ORDER — DICYCLOMINE HYDROCHLORIDE 10 MG/1
CAPSULE ORAL
Qty: 120 CAPSULE | Refills: 0 | Status: SHIPPED | OUTPATIENT
Start: 2019-07-20 | End: 2023-06-07

## 2019-08-01 ENCOUNTER — LAB VISIT (OUTPATIENT)
Dept: LAB | Facility: HOSPITAL | Age: 24
End: 2019-08-01
Attending: INTERNAL MEDICINE
Payer: COMMERCIAL

## 2019-08-01 DIAGNOSIS — Z00.00 ANNUAL PHYSICAL EXAM: ICD-10-CM

## 2019-08-01 LAB
ALBUMIN SERPL BCP-MCNC: 3.2 G/DL (ref 3.5–5.2)
ALP SERPL-CCNC: 81 U/L (ref 55–135)
ALT SERPL W/O P-5'-P-CCNC: 23 U/L (ref 10–44)
ANION GAP SERPL CALC-SCNC: 8 MMOL/L (ref 8–16)
AST SERPL-CCNC: 20 U/L (ref 10–40)
BASOPHILS # BLD AUTO: 0.04 K/UL (ref 0–0.2)
BASOPHILS NFR BLD: 0.4 % (ref 0–1.9)
BILIRUB SERPL-MCNC: 0.5 MG/DL (ref 0.1–1)
BUN SERPL-MCNC: 6 MG/DL (ref 6–20)
CALCIUM SERPL-MCNC: 9.2 MG/DL (ref 8.7–10.5)
CHLORIDE SERPL-SCNC: 102 MMOL/L (ref 95–110)
CHOLEST SERPL-MCNC: 212 MG/DL (ref 120–199)
CHOLEST/HDLC SERPL: 5.4 {RATIO} (ref 2–5)
CO2 SERPL-SCNC: 20 MMOL/L (ref 23–29)
CREAT SERPL-MCNC: 0.7 MG/DL (ref 0.5–1.4)
DIFFERENTIAL METHOD: ABNORMAL
EOSINOPHIL # BLD AUTO: 0.2 K/UL (ref 0–0.5)
EOSINOPHIL NFR BLD: 1.9 % (ref 0–8)
ERYTHROCYTE [DISTWIDTH] IN BLOOD BY AUTOMATED COUNT: 13.6 % (ref 11.5–14.5)
EST. GFR  (AFRICAN AMERICAN): >60 ML/MIN/1.73 M^2
EST. GFR  (NON AFRICAN AMERICAN): >60 ML/MIN/1.73 M^2
GLUCOSE SERPL-MCNC: 89 MG/DL (ref 70–110)
HCT VFR BLD AUTO: 41.4 % (ref 37–48.5)
HDLC SERPL-MCNC: 39 MG/DL (ref 40–75)
HDLC SERPL: 18.4 % (ref 20–50)
HGB BLD-MCNC: 12.9 G/DL (ref 12–16)
IMM GRANULOCYTES # BLD AUTO: 0.02 K/UL (ref 0–0.04)
IMM GRANULOCYTES NFR BLD AUTO: 0.2 % (ref 0–0.5)
LDLC SERPL CALC-MCNC: 131.2 MG/DL (ref 63–159)
LYMPHOCYTES # BLD AUTO: 4.1 K/UL (ref 1–4.8)
LYMPHOCYTES NFR BLD: 40.5 % (ref 18–48)
MCH RBC QN AUTO: 27.2 PG (ref 27–31)
MCHC RBC AUTO-ENTMCNC: 31.2 G/DL (ref 32–36)
MCV RBC AUTO: 87 FL (ref 82–98)
MONOCYTES # BLD AUTO: 0.5 K/UL (ref 0.3–1)
MONOCYTES NFR BLD: 5.3 % (ref 4–15)
NEUTROPHILS # BLD AUTO: 5.2 K/UL (ref 1.8–7.7)
NEUTROPHILS NFR BLD: 51.7 % (ref 38–73)
NONHDLC SERPL-MCNC: 173 MG/DL
NRBC BLD-RTO: 0 /100 WBC
PLATELET # BLD AUTO: 420 K/UL (ref 150–350)
PMV BLD AUTO: 10.5 FL (ref 9.2–12.9)
POTASSIUM SERPL-SCNC: 3.8 MMOL/L (ref 3.5–5.1)
PROT SERPL-MCNC: 7.2 G/DL (ref 6–8.4)
RBC # BLD AUTO: 4.75 M/UL (ref 4–5.4)
SODIUM SERPL-SCNC: 130 MMOL/L (ref 136–145)
TRIGL SERPL-MCNC: 209 MG/DL (ref 30–150)
TSH SERPL DL<=0.005 MIU/L-ACNC: 2.35 UIU/ML (ref 0.4–4)
WBC # BLD AUTO: 10.04 K/UL (ref 3.9–12.7)

## 2019-08-01 PROCEDURE — 83036 HEMOGLOBIN GLYCOSYLATED A1C: CPT

## 2019-08-01 PROCEDURE — 80053 COMPREHEN METABOLIC PANEL: CPT

## 2019-08-01 PROCEDURE — 85025 COMPLETE CBC W/AUTO DIFF WBC: CPT

## 2019-08-01 PROCEDURE — 80061 LIPID PANEL: CPT

## 2019-08-01 PROCEDURE — 36415 COLL VENOUS BLD VENIPUNCTURE: CPT | Mod: PO

## 2019-08-01 PROCEDURE — 84443 ASSAY THYROID STIM HORMONE: CPT

## 2019-08-02 ENCOUNTER — OFFICE VISIT (OUTPATIENT)
Dept: INTERNAL MEDICINE | Facility: CLINIC | Age: 24
End: 2019-08-02
Payer: COMMERCIAL

## 2019-08-02 ENCOUNTER — LAB VISIT (OUTPATIENT)
Dept: LAB | Facility: HOSPITAL | Age: 24
End: 2019-08-02
Attending: INTERNAL MEDICINE
Payer: COMMERCIAL

## 2019-08-02 VITALS
DIASTOLIC BLOOD PRESSURE: 63 MMHG | HEIGHT: 62 IN | WEIGHT: 182.13 LBS | BODY MASS INDEX: 33.51 KG/M2 | SYSTOLIC BLOOD PRESSURE: 102 MMHG | OXYGEN SATURATION: 98 % | TEMPERATURE: 99 F | HEART RATE: 108 BPM

## 2019-08-02 DIAGNOSIS — D75.839 THROMBOCYTOSIS: ICD-10-CM

## 2019-08-02 DIAGNOSIS — E87.1 HYPONATREMIA: ICD-10-CM

## 2019-08-02 DIAGNOSIS — F32.A ANXIETY AND DEPRESSION: ICD-10-CM

## 2019-08-02 DIAGNOSIS — Z00.00 ANNUAL PHYSICAL EXAM: ICD-10-CM

## 2019-08-02 DIAGNOSIS — R00.0 TACHYCARDIA: ICD-10-CM

## 2019-08-02 DIAGNOSIS — F41.9 ANXIETY AND DEPRESSION: ICD-10-CM

## 2019-08-02 DIAGNOSIS — D75.839 THROMBOCYTOSIS: Primary | ICD-10-CM

## 2019-08-02 LAB
ALBUMIN SERPL BCP-MCNC: 3.4 G/DL (ref 3.5–5.2)
ALP SERPL-CCNC: 80 U/L (ref 55–135)
ALT SERPL W/O P-5'-P-CCNC: 20 U/L (ref 10–44)
ANION GAP SERPL CALC-SCNC: 8 MMOL/L (ref 8–16)
AST SERPL-CCNC: 20 U/L (ref 10–40)
BASOPHILS # BLD AUTO: 0.05 K/UL (ref 0–0.2)
BASOPHILS NFR BLD: 0.5 % (ref 0–1.9)
BILIRUB SERPL-MCNC: 0.4 MG/DL (ref 0.1–1)
BUN SERPL-MCNC: 6 MG/DL (ref 6–20)
CALCIUM SERPL-MCNC: 9.1 MG/DL (ref 8.7–10.5)
CHLORIDE SERPL-SCNC: 106 MMOL/L (ref 95–110)
CO2 SERPL-SCNC: 24 MMOL/L (ref 23–29)
CREAT SERPL-MCNC: 0.8 MG/DL (ref 0.5–1.4)
DIFFERENTIAL METHOD: ABNORMAL
EOSINOPHIL # BLD AUTO: 0.1 K/UL (ref 0–0.5)
EOSINOPHIL NFR BLD: 0.5 % (ref 0–8)
ERYTHROCYTE [DISTWIDTH] IN BLOOD BY AUTOMATED COUNT: 13.8 % (ref 11.5–14.5)
EST. GFR  (AFRICAN AMERICAN): >60 ML/MIN/1.73 M^2
EST. GFR  (NON AFRICAN AMERICAN): >60 ML/MIN/1.73 M^2
ESTIMATED AVG GLUCOSE: 108 MG/DL (ref 68–131)
FERRITIN SERPL-MCNC: 11 NG/ML (ref 20–300)
GLUCOSE SERPL-MCNC: 105 MG/DL (ref 70–110)
HBA1C MFR BLD HPLC: 5.4 % (ref 4–5.6)
HCT VFR BLD AUTO: 38.3 % (ref 37–48.5)
HGB BLD-MCNC: 12.3 G/DL (ref 12–16)
IMM GRANULOCYTES # BLD AUTO: 0.03 K/UL (ref 0–0.04)
IMM GRANULOCYTES NFR BLD AUTO: 0.3 % (ref 0–0.5)
IRON SERPL-MCNC: 66 UG/DL (ref 30–160)
LYMPHOCYTES # BLD AUTO: 3.1 K/UL (ref 1–4.8)
LYMPHOCYTES NFR BLD: 31.5 % (ref 18–48)
MCH RBC QN AUTO: 27.2 PG (ref 27–31)
MCHC RBC AUTO-ENTMCNC: 32.1 G/DL (ref 32–36)
MCV RBC AUTO: 85 FL (ref 82–98)
MONOCYTES # BLD AUTO: 0.5 K/UL (ref 0.3–1)
MONOCYTES NFR BLD: 5 % (ref 4–15)
NEUTROPHILS # BLD AUTO: 6.1 K/UL (ref 1.8–7.7)
NEUTROPHILS NFR BLD: 62.2 % (ref 38–73)
NRBC BLD-RTO: 0 /100 WBC
PLATELET # BLD AUTO: 409 K/UL (ref 150–350)
PMV BLD AUTO: 10.7 FL (ref 9.2–12.9)
POTASSIUM SERPL-SCNC: 4.1 MMOL/L (ref 3.5–5.1)
PROT SERPL-MCNC: 7.1 G/DL (ref 6–8.4)
RBC # BLD AUTO: 4.52 M/UL (ref 4–5.4)
SATURATED IRON: 14 % (ref 20–50)
SODIUM SERPL-SCNC: 138 MMOL/L (ref 136–145)
TOTAL IRON BINDING CAPACITY: 472 UG/DL (ref 250–450)
TRANSFERRIN SERPL-MCNC: 319 MG/DL (ref 200–375)
WBC # BLD AUTO: 9.86 K/UL (ref 3.9–12.7)

## 2019-08-02 PROCEDURE — 85025 COMPLETE CBC W/AUTO DIFF WBC: CPT

## 2019-08-02 PROCEDURE — 93005 EKG 12-LEAD: ICD-10-PCS | Mod: S$GLB,,, | Performed by: INTERNAL MEDICINE

## 2019-08-02 PROCEDURE — 36415 COLL VENOUS BLD VENIPUNCTURE: CPT | Mod: PO

## 2019-08-02 PROCEDURE — 99999 PR PBB SHADOW E&M-EST. PATIENT-LVL III: CPT | Mod: PBBFAC,,, | Performed by: INTERNAL MEDICINE

## 2019-08-02 PROCEDURE — 99395 PREV VISIT EST AGE 18-39: CPT | Mod: S$GLB,,, | Performed by: INTERNAL MEDICINE

## 2019-08-02 PROCEDURE — 93010 EKG 12-LEAD: ICD-10-PCS | Mod: S$GLB,,, | Performed by: INTERNAL MEDICINE

## 2019-08-02 PROCEDURE — 99395 PR PREVENTIVE VISIT,EST,18-39: ICD-10-PCS | Mod: S$GLB,,, | Performed by: INTERNAL MEDICINE

## 2019-08-02 PROCEDURE — 93010 ELECTROCARDIOGRAM REPORT: CPT | Mod: S$GLB,,, | Performed by: INTERNAL MEDICINE

## 2019-08-02 PROCEDURE — 80053 COMPREHEN METABOLIC PANEL: CPT

## 2019-08-02 PROCEDURE — 93005 ELECTROCARDIOGRAM TRACING: CPT | Mod: S$GLB,,, | Performed by: INTERNAL MEDICINE

## 2019-08-02 PROCEDURE — 82728 ASSAY OF FERRITIN: CPT

## 2019-08-02 PROCEDURE — 83540 ASSAY OF IRON: CPT

## 2019-08-02 PROCEDURE — 99999 PR PBB SHADOW E&M-EST. PATIENT-LVL III: ICD-10-PCS | Mod: PBBFAC,,, | Performed by: INTERNAL MEDICINE

## 2019-08-02 RX ORDER — FEXOFENADINE HCL 60 MG
60 TABLET ORAL DAILY
COMMUNITY
End: 2022-06-16

## 2019-08-02 NOTE — PROGRESS NOTES
Subjective:       Patient ID: Yulissa Carias is a 24 y.o. female.    Chief Complaint: Annual Exam    HPI    24 y.o. female here for annual exam.     Health Maintenance:   Lipid disorders/ASCVD risk: utd    DM: A1c 5.4  Sexual/ STD Screening: no concerns  Cervical Cancer (21-65y):  2017      Vaccines:   Influenza (yearly)    Tetanus (every 10 yrs - 1st tdap) 08/2018    HPV (19-26 F; 19-21 M) completed      PHQ-9 (screening- single yes answer indicates possibility of depressive disorder)  Over the past 2 weeks, have you felt down, depressed, hopeless? yes   Over the past 2 weeks, have you felt little interest or pleasure in doing things? yes    PHQ-9  (score on 4 point scale from 0-not at all, 1-several days, 2-more than half the days, 3-nearly every day) Scores of 5, 10, 15, and 20 represent cutpoints for mild,   moderate, moderately severe and severe depression, respectively. Score of 15 or greater warrants treatment. Use clinical judgement to determine if treatment for scores of 5-14.    1. Little interest or pleasure in doing things? 1  2. Feeling down, depressed, or hopeless? 1  3. Trouble falling or staying asleep, or sleeping too much? 1  4. Feeling tired or having little energy? 0  5. Poor appetite or overeating? 0  6. Feeling bad about yourself--or that you are a failure or have let yourself or your family down? 1  7. Trouble concentrating on things, such as reading the newspaper or watching television? 0  8. Moving or speaking so slowly that other people could have noticed? Or the opposite--being so fidgety or restless that you have been moving around a lot more   than usual? 2  9. Thoughts that you would be better off dead or of hurting yourself in some way? 1    Patient's total: 7        Past Medical History:   Diagnosis Date    Allergy     Seasonal     Asthma     Biliary dyskinesia Jan 2014    lap rubio 1/10/14 Dr. Morales [facility]    IBS (irritable bowel syndrome) 2017     Past Surgical History:    Procedure Laterality Date    CHOLECYSTECTOMY      CHOLECYSTECTOMY, LAPAROSCOPIC N/A 1/10/2014    Performed [facility] at [facility] OR 2ND FLR    ESOPHAGOGASTRODUODENOSCOPY (EGD) N/A 12/29/2014    Performed by Taylor Juarez MD at [facility] ENDO (2ND FLR)    lap rubio  1/10/14    Dr. Morales [facility] - Biliary dyskinesia/chronic cholecystitis     Family History   Problem Relation Age of Onset    Hypertension Mother     Diabetes Mother         prediabetes    Hyperlipidemia Maternal Grandmother     Hypertension Paternal Grandfather     Celiac disease Maternal Aunt     Sinus disease Father     Anxiety disorder Father     Acne Neg Hx     Melanoma Neg Hx     Psoriasis Neg Hx     Lupus Neg Hx     Eczema Neg Hx     Breast cancer Neg Hx     Colon cancer Neg Hx     Ovarian cancer Neg Hx      Social History     Socioeconomic History    Marital status: Single     Spouse name: Not on file    Number of children: Not on file    Years of education: Not on file    Highest education level: Not on file   Occupational History    Occupation: Student      Comment: [employer]   Social Needs    Financial resource strain: Not hard at all    Food insecurity:     Worry: Never true     Inability: Never true    Transportation needs:     Medical: No     Non-medical: No   Tobacco Use    Smoking status: Never Smoker    Smokeless tobacco: Never Used   Substance and Sexual Activity    Alcohol use: No     Alcohol/week: 0.0 oz     Frequency: 2-4 times a month     Drinks per session: 1 or 2     Binge frequency: Less than monthly     Comment: occasionally    Drug use: No    Sexual activity: Never     Birth control/protection: OCP   Lifestyle    Physical activity:     Days per week: 2 days     Minutes per session: 20 min    Stress: To some extent   Relationships    Social connections:     Talks on phone: More than three times a week     Gets together: More than three times a week     Attends Religion  "service: Not on file     Active member of club or organization: Yes     Attends meetings of clubs or organizations: More than 4 times per year     Relationship status: Never    Other Topics Concern    Are you pregnant or think you may be? No    Breast-feeding No   Social History Narrative    Goes to [employer] [facility] for [employer]         Review of patient's allergies indicates:  No Known Allergies    Current Outpatient Medications:     cyanocobalamin (VITAMIN B-12) 500 MCG tablet, Take 500 mcg by mouth once daily., Disp: , Rfl:     dicyclomine (BENTYL) 10 MG capsule, TAKE 1 CAPSULE(10 MG) BY MOUTH THREE TIMES DAILY, Disp: 120 capsule, Rfl: 0    fexofenadine (ALLEGRA) 60 MG tablet, Take 60 mg by mouth once daily., Disp: , Rfl:     fluticasone (FLONASE) 50 mcg/actuation nasal spray, 1 spray by Nasal route once daily., Disp: 16 g, Rfl: 3    JUNEL FE 1/20, 28, 1 mg-20 mcg (21)/75 mg (7) per tablet, TAKE 1 TABLET BY MOUTH EVERY DAY, Disp: 84 tablet, Rfl: 0    multivitamin capsule, Take 1 capsule by mouth once daily., Disp: , Rfl:       Review of Systems   Constitutional: Negative for activity change and fever.   Eyes: Negative for discharge.   Respiratory: Negative for shortness of breath and wheezing.    Cardiovascular: Negative for chest pain and palpitations.   Gastrointestinal: Negative for constipation, diarrhea and vomiting.   Genitourinary: Negative for difficulty urinating and hematuria.   Musculoskeletal: Negative for neck stiffness.   Skin: Negative for wound.   Neurological: Negative for headaches.   Psychiatric/Behavioral: Positive for dysphoric mood. The patient is nervous/anxious.        Objective:        Vitals:    08/02/19 1402 08/02/19 1427   BP: 102/63    BP Location: Right arm    Pulse: (!) 130 108   Temp: 99.1 °F (37.3 °C)    TempSrc: Oral    SpO2: 98%    Weight: 82.6 kg (182 lb 1.6 oz)    Height: 5' 2" (1.575 m)        Body mass index is 33.31 kg/m².    Physical Exam "   Constitutional: She is oriented to person, place, and time. She appears well-developed. No distress.   HENT:   Head: Normocephalic and atraumatic.   Right Ear: Tympanic membrane normal.   Left Ear: Tympanic membrane normal.   Nose: Nose normal.   Mouth/Throat: Oropharynx is clear and moist.   Eyes: Conjunctivae and EOM are normal.   Neck: Normal range of motion. Neck supple. No tracheal deviation present. No thyromegaly present.   Cardiovascular: Regular rhythm, normal heart sounds and intact distal pulses. Tachycardia present.   Pulmonary/Chest: Effort normal and breath sounds normal.   Abdominal: Soft. Bowel sounds are normal. She exhibits no distension. There is no tenderness.   Musculoskeletal: Normal range of motion. She exhibits no edema.   Lymphadenopathy:     She has no cervical adenopathy.   Neurological: She is alert and oriented to person, place, and time. No sensory deficit.   Skin: Skin is warm and dry. She is not diaphoretic. No cyanosis. Nails show no clubbing.   Psychiatric: She has a normal mood and affect. Her behavior is normal. Judgment normal.       Assessment:     1. Thrombocytosis    2. Annual physical exam    3. Hyponatremia    4. Tachycardia    5. Anxiety and depression           Plan:         1. Annual physical exam  - CBC auto differential; Future  - Ferritin; Future  - Iron and TIBC; Future  - Comprehensive metabolic panel; Future  - Urinalysis; Future    2. Thrombocytosis  - CBC auto differential; Future  - Ferritin; Future  - Iron and TIBC; Future    3. Hyponatremia  - Comprehensive metabolic panel; Future  - Urinalysis; Future    4. Tachycardia  - EKG 12-lead; Future    5. Anxiety and depression  - discussed SSRI therapy and seeing a therapist at her school. PHQ9 score is fairly low, but she is clearly bothered by her symptoms and we suspect that symptoms will likely worsen [place] We plan to start Lexapro at a low dose, but we are repeating CMP  today to determine if hyponatremia is actually present as we would not want to start SSRI in setting of true hyponatremia. If we do start lexapro, she is okay to send portal message for dosage adjustment.            Patient note was created using MModal Dictation.  Any errors in syntax or even information may not have been identified and edited on initial review prior to signing this note.

## 2019-08-04 ENCOUNTER — PATIENT MESSAGE (OUTPATIENT)
Dept: INTERNAL MEDICINE | Facility: CLINIC | Age: 24
End: 2019-08-04

## 2019-08-04 DIAGNOSIS — F32.A ANXIETY AND DEPRESSION: Primary | ICD-10-CM

## 2019-08-04 DIAGNOSIS — N94.6 DYSMENORRHEA: ICD-10-CM

## 2019-08-04 DIAGNOSIS — F41.9 ANXIETY AND DEPRESSION: Primary | ICD-10-CM

## 2019-08-04 RX ORDER — ESCITALOPRAM OXALATE 10 MG/1
10 TABLET ORAL DAILY
Qty: 30 TABLET | Refills: 0 | Status: SHIPPED | OUTPATIENT
Start: 2019-08-04 | End: 2019-09-08 | Stop reason: SDUPTHER

## 2019-08-05 RX ORDER — NORETHINDRONE ACETATE AND ETHINYL ESTRADIOL AND FERROUS FUMARATE 1MG-20(21)
KIT ORAL
Qty: 84 TABLET | Refills: 0 | Status: SHIPPED | OUTPATIENT
Start: 2019-08-05 | End: 2022-06-16

## 2019-09-06 ENCOUNTER — PATIENT MESSAGE (OUTPATIENT)
Dept: INTERNAL MEDICINE | Facility: CLINIC | Age: 24
End: 2019-09-06

## 2019-09-08 RX ORDER — ESCITALOPRAM OXALATE 10 MG/1
10 TABLET ORAL DAILY
Qty: 90 TABLET | Refills: 3 | Status: SHIPPED | OUTPATIENT
Start: 2019-09-08 | End: 2020-08-24 | Stop reason: SDUPTHER

## 2020-05-19 ENCOUNTER — OFFICE VISIT (OUTPATIENT)
Dept: INTERNAL MEDICINE | Facility: CLINIC | Age: 25
End: 2020-05-19
Payer: COMMERCIAL

## 2020-05-19 VITALS
BODY MASS INDEX: 33.65 KG/M2 | HEIGHT: 62 IN | OXYGEN SATURATION: 98 % | HEART RATE: 89 BPM | TEMPERATURE: 99 F | WEIGHT: 182.88 LBS | SYSTOLIC BLOOD PRESSURE: 90 MMHG | DIASTOLIC BLOOD PRESSURE: 70 MMHG

## 2020-05-19 DIAGNOSIS — J32.9 RECURRENT SINUS INFECTIONS: Primary | ICD-10-CM

## 2020-05-19 PROCEDURE — 99999 PR PBB SHADOW E&M-EST. PATIENT-LVL IV: ICD-10-PCS | Mod: PBBFAC,,, | Performed by: FAMILY MEDICINE

## 2020-05-19 PROCEDURE — 99999 PR PBB SHADOW E&M-EST. PATIENT-LVL IV: CPT | Mod: PBBFAC,,, | Performed by: FAMILY MEDICINE

## 2020-05-19 PROCEDURE — 99213 OFFICE O/P EST LOW 20 MIN: CPT | Mod: S$GLB,,, | Performed by: FAMILY MEDICINE

## 2020-05-19 PROCEDURE — 99213 PR OFFICE/OUTPT VISIT, EST, LEVL III, 20-29 MIN: ICD-10-PCS | Mod: S$GLB,,, | Performed by: FAMILY MEDICINE

## 2020-05-19 RX ORDER — AMOXICILLIN AND CLAVULANATE POTASSIUM 875; 125 MG/1; MG/1
1 TABLET, FILM COATED ORAL EVERY 12 HOURS
Qty: 20 TABLET | Refills: 0 | Status: SHIPPED | OUTPATIENT
Start: 2020-05-19 | End: 2020-05-29

## 2020-05-19 NOTE — PATIENT INSTRUCTIONS

## 2020-05-19 NOTE — PROGRESS NOTES
"[facility] - Family Medicine/ Internal Medicine  Clinic Note      Subjective:       Patient ID: Yulissa Carias is a 24 y.o. female.    Chief Complaint: sinus, sore throat, cchest congestion, fatigue    Patient is here today for sick visit.  She has history of recurrent sinus infections, became ill last week and feels like this is another sinus infection.  No cough or shortness of breath.    Sinusitis   This is a recurrent problem. Episode onset: 7-10 days. The problem has been gradually worsening since onset. Maximum temperature: low-grade subjective. Associated symptoms include congestion, ear pain, headaches, sinus pressure and a sore throat. Pertinent negatives include no chills, coughing, shortness of breath or swollen glands.     Review of Systems   Constitutional: Negative for chills, fatigue and fever.   HENT: Positive for congestion, ear pain, postnasal drip, rhinorrhea, sinus pressure, sinus pain and sore throat.    Respiratory: Negative for cough and shortness of breath.    Gastrointestinal: Negative for diarrhea, nausea and vomiting.   Neurological: Positive for headaches.       Objective:      BP 90/70 (BP Location: Right arm, Patient Position: Sitting, BP Method: Large (Manual))   Pulse 89   Temp 98.8 °F (37.1 °C)   Ht 5' 2" (1.575 m)   Wt 83 kg (182 lb 14 oz)   LMP 05/13/2020   SpO2 98%   BMI 33.45 kg/m²   Physical Exam   Constitutional: She is oriented to person, place, and time. She appears well-developed and well-nourished. No distress.   HENT:   Head: Normocephalic and atraumatic.   Right Ear: Tympanic membrane and ear canal normal. Tympanic membrane is not erythematous and not retracted. No middle ear effusion.   Left Ear: Tympanic membrane and ear canal normal. Tympanic membrane is not erythematous and not retracted.  No middle ear effusion.   Nose: Rhinorrhea present. No mucosal edema. Right sinus exhibits maxillary sinus tenderness and frontal sinus " tenderness. Left sinus exhibits no maxillary sinus tenderness and no frontal sinus tenderness.   Mouth/Throat: Oropharynx is clear and moist and mucous membranes are normal. No posterior oropharyngeal edema or posterior oropharyngeal erythema.   Neck: Normal range of motion.   Cardiovascular: Normal rate and regular rhythm.   No murmur heard.  Pulmonary/Chest: Effort normal and breath sounds normal. No tachypnea. No respiratory distress. She has no wheezes. She has no rhonchi. She has no rales.   Abdominal: Soft. Bowel sounds are normal. She exhibits no distension. There is no tenderness.   Musculoskeletal: Normal range of motion.   Lymphadenopathy:     She has no cervical adenopathy.   Neurological: She is alert and oriented to person, place, and time.   Skin: Skin is warm and dry. No rash noted.   Psychiatric: She has a normal mood and affect.   Vitals reviewed.      Assessment:       1. Recurrent sinus infections        Plan:     1. Recurrent sinus infections  - exam findings reviewed, generally reassuring and without alarm signs/symptoms  - differential reviewed, presentation of prolonged illness is most consistent with acute sinusitis  - basic pathophysiology reviewed  - supportive care measures reviewed, have recommended increased oral fluids and judicious use of OTC cough-cold remedies, handout provided   - treatment options reviewed, will start empiric course of antibiotic and dosing instructions and potential side effects reviewed   - - amoxicillin-clavulanate 875-125mg (AUGMENTIN) 875-125 mg per tablet; Take 1 tablet by mouth every 12 (twelve) hours. for 10 days  Dispense: 20 tablet; Refill: 0   - questions answered, warning signs reviewed, patient is comfortable with this plan and was encouraged to call the office for any concerns or worsening of condition     - Follow up in the next 1-2 weeks as needed, for follow-up sinus infection.     Messi Hughes MD  5/19/2020          Medication List with  Changes/Refills   New Medications    AMOXICILLIN-CLAVULANATE 875-125MG (AUGMENTIN) 875-125 MG PER TABLET    Take 1 tablet by mouth every 12 (twelve) hours. for 10 days   Current Medications    CYANOCOBALAMIN (VITAMIN B-12) 500 MCG TABLET    Take 500 mcg by mouth once daily.    DICYCLOMINE (BENTYL) 10 MG CAPSULE    TAKE 1 CAPSULE(10 MG) BY MOUTH THREE TIMES DAILY    ESCITALOPRAM OXALATE (LEXAPRO) 10 MG TABLET    Take 1 tablet (10 mg total) by mouth once daily.    FEXOFENADINE (ALLEGRA) 60 MG TABLET    Take 60 mg by mouth once daily.    FLUTICASONE (FLONASE) 50 MCG/ACTUATION NASAL SPRAY    1 spray by Nasal route once daily.    JUNEL FE 1/20, 28, 1 MG-20 MCG (21)/75 MG (7) PER TABLET    TAKE 1 TABLET BY MOUTH EVERY DAY    MULTIVITAMIN CAPSULE    Take 1 capsule by mouth once daily.

## 2020-07-20 ENCOUNTER — OFFICE VISIT (OUTPATIENT)
Dept: INTERNAL MEDICINE | Facility: CLINIC | Age: 25
End: 2020-07-20
Payer: COMMERCIAL

## 2020-07-20 DIAGNOSIS — G43.909 MIGRAINE WITHOUT STATUS MIGRAINOSUS, NOT INTRACTABLE, UNSPECIFIED MIGRAINE TYPE: ICD-10-CM

## 2020-07-20 DIAGNOSIS — N30.00 ACUTE CYSTITIS WITHOUT HEMATURIA: Primary | ICD-10-CM

## 2020-07-20 PROCEDURE — 99213 PR OFFICE/OUTPT VISIT, EST, LEVL III, 20-29 MIN: ICD-10-PCS | Mod: 95,,, | Performed by: INTERNAL MEDICINE

## 2020-07-20 PROCEDURE — 99213 OFFICE O/P EST LOW 20 MIN: CPT | Mod: 95,,, | Performed by: INTERNAL MEDICINE

## 2020-07-20 RX ORDER — SUMATRIPTAN 50 MG/1
50 TABLET, FILM COATED ORAL ONCE
Qty: 1 TABLET | Refills: 0 | Status: SHIPPED | OUTPATIENT
Start: 2020-07-20 | End: 2020-09-23 | Stop reason: SDUPTHER

## 2020-07-20 RX ORDER — CIPROFLOXACIN 250 MG/1
250 TABLET, FILM COATED ORAL EVERY 12 HOURS
Qty: 14 TABLET | Refills: 0 | Status: SHIPPED | OUTPATIENT
Start: 2020-07-20 | End: 2020-12-08

## 2020-07-20 NOTE — PATIENT INSTRUCTIONS
1. cipro 250 mgm BID for 7 days sent to local pharmacy for the presumed UTI.  2. Generic Imitrex 50 mgm refilled.  3. Increased oral fluids.  4. Followup with regular PCP if not improving in 48-72 hours.

## 2020-07-20 NOTE — PROGRESS NOTES
The patient location is: her home  The chief complaint leading to consultation is: urinary symptoms and migraine headaces.    Visit type: audiovisual    Face to Face time with patient: 15  15  minutes of total time spent on the encounter, which includes face to face time and non-face to face time preparing to see the patient (eg, review of tests), Obtaining and/or reviewing separately obtained history, Documenting clinical information in the electronic or other health record, Independently interpreting results (not separately reported) and communicating results to the patient/family/caregiver, or Care coordination (not separately reported).         Each patient to whom he or she provides medical services by telemedicine is:  (1) informed of the relationship between the physician and patient and the respective role of any other health care provider with respect to management of the patient; and (2) notified that he or she may decline to receive medical services by telemedicine and may withdraw from such care at any time.    Notes: Subjective:       Patient ID: Yulissa Carias is a 24 y.o. female.    Chief Complaint: No chief complaint on file.    Virtual visit that lasted 15 minutes.  Patient with 2 complaints:  1. Acute onset of urinary burning, frequency, urgency 2 nights ago.  No fever/chills.  No sign of blood.  Has had UTI in past but not recently.  She is increasing her fluid intake as well.  No flank pain.  2. Recurrent migraine headaches in the past week.  Associated with nausea and photophobia.  Had some sumatriptan to use that it was not helping.  She realized it was out of date.  No visual disturbances; no real aura.  Not intractable.    Review of Systems   Constitutional: Negative for activity change, appetite change, chills and fever.   Respiratory: Negative.    Cardiovascular: Negative.    Gastrointestinal: Negative.    Genitourinary: Positive for dysuria, frequency and urgency. Negative for decreased  urine volume, flank pain and hematuria.   Neurological: Positive for headaches. Negative for dizziness, vertigo, tremors, seizures, syncope, facial asymmetry, speech difficulty, weakness, light-headedness, disturbances in coordination, memory loss and coordination difficulties.         Objective:      Physical Exam  Constitutional:       General: She is not in acute distress.     Appearance: Normal appearance.   Neurological:      Mental Status: She is alert.   Psychiatric:         Mood and Affect: Mood normal.         Behavior: Behavior normal.         Thought Content: Thought content normal.         Judgment: Judgment normal.         Assessment:       1. Acute cystitis without hematuria    2. Migraine without status migrainosus, not intractable, unspecified migraine type        Plan:     1. After discussion with patient, I will treat the UTI with 7 days of generic cipro 250 BID.  She is also advised to increased oral water intake.  She is also advised to followup with regular PCP if not improving in 48-71 hours.  2. Generic Imitrex 50 mgm refilled for the migraine.    Total visit time was approximately 15 minutes.

## 2020-08-24 RX ORDER — ESCITALOPRAM OXALATE 10 MG/1
10 TABLET ORAL DAILY
Qty: 90 TABLET | Refills: 0 | Status: SHIPPED | OUTPATIENT
Start: 2020-08-24 | End: 2020-11-16

## 2020-09-23 DIAGNOSIS — G43.909 MIGRAINE WITHOUT STATUS MIGRAINOSUS, NOT INTRACTABLE, UNSPECIFIED MIGRAINE TYPE: ICD-10-CM

## 2020-09-23 RX ORDER — SUMATRIPTAN 50 MG/1
50 TABLET, FILM COATED ORAL ONCE
Qty: 30 TABLET | Refills: 0 | Status: SHIPPED | OUTPATIENT
Start: 2020-09-23 | End: 2020-09-23

## 2020-12-08 ENCOUNTER — HOSPITAL ENCOUNTER (OUTPATIENT)
Dept: RADIOLOGY | Facility: HOSPITAL | Age: 25
Discharge: HOME | End: 2020-12-08
Attending: HOSPITALIST
Payer: COMMERCIAL

## 2020-12-08 ENCOUNTER — OFFICE VISIT (OUTPATIENT)
Dept: INTERNAL MEDICINE | Facility: CLINIC | Age: 25
End: 2020-12-08
Payer: COMMERCIAL

## 2020-12-08 VITALS
BODY MASS INDEX: 34.29 KG/M2 | HEART RATE: 93 BPM | WEIGHT: 186.31 LBS | RESPIRATION RATE: 16 BRPM | TEMPERATURE: 98 F | SYSTOLIC BLOOD PRESSURE: 114 MMHG | DIASTOLIC BLOOD PRESSURE: 78 MMHG | HEIGHT: 62 IN

## 2020-12-08 DIAGNOSIS — M79.89 SWELLING OF LEFT MIDDLE FINGER: ICD-10-CM
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
## 2020-12-08 DIAGNOSIS — M79.89 SWELLING OF LEFT MIDDLE FINGER: Primary | ICD-10-CM

## 2020-12-08 PROCEDURE — 73130 X-RAY EXAM OF HAND: CPT | Mod: 26,LT,, | Performed by: RADIOLOGY

## 2020-12-08 PROCEDURE — 99999 PR PBB SHADOW E&M-EST. PATIENT-LVL IV: CPT | Mod: PBBFAC,,, | Performed by: HOSPITALIST

## 2020-12-08 PROCEDURE — 73130 XR HAND COMPLETE 3 VIEW LEFT: ICD-10-PCS | Mod: 26,LT,, | Performed by: RADIOLOGY

## 2020-12-08 PROCEDURE — 73130 X-RAY EXAM OF HAND: CPT | Mod: TC,PO,LT

## 2020-12-08 PROCEDURE — 99214 OFFICE O/P EST MOD 30 MIN: CPT | Mod: S$GLB,,, | Performed by: HOSPITALIST

## 2020-12-08 PROCEDURE — 99999 PR PBB SHADOW E&M-EST. PATIENT-LVL IV: ICD-10-PCS | Mod: PBBFAC,,, | Performed by: HOSPITALIST

## 2020-12-08 PROCEDURE — 99214 PR OFFICE/OUTPT VISIT, EST, LEVL IV, 30-39 MIN: ICD-10-PCS | Mod: S$GLB,,, | Performed by: HOSPITALIST

## 2020-12-08 NOTE — PROGRESS NOTES
Subjective:     @Patient ID: Yulissa Carias is a 25 y.o. female.    Chief Complaint: No chief complaint on file.    HPI  24 yo F presents for urgent visit s/p fall. Reports bruised and swollen finger. Reports left middle finger. Reports holding her dog leash and was walking her dogs. She fell on it. States not sure if fell with hand outstretched. Has it taped up. Hard to move due pain. She has used ice and tylenol.     Review of Systems   Constitutional: Negative for chills and fever.   HENT: Negative for congestion and sore throat.    Eyes: Negative for pain and visual disturbance.   Respiratory: Negative for cough and shortness of breath.    Cardiovascular: Negative for chest pain and leg swelling.   Gastrointestinal: Negative for abdominal pain, nausea and vomiting.   Endocrine: Negative for polydipsia and polyuria.   Genitourinary: Negative for difficulty urinating and dysuria.   Musculoskeletal: Positive for arthralgias and joint swelling. Negative for back pain.   Skin: Negative for rash and wound.   Neurological: Negative for dizziness, weakness and headaches.   Psychiatric/Behavioral: Negative for agitation and confusion.     Past medical history, surgical history, and family medical history reviewed and updated as appropriate.    Medications and allergies reviewed.     Objective:     There were no vitals filed for this visit.  There is no height or weight on file to calculate BMI.  Physical Exam  Vitals signs reviewed.   Constitutional:       General: She is not in acute distress.     Appearance: She is well-developed.   HENT:      Head: Normocephalic and atraumatic.   Eyes:      General:         Right eye: No discharge.         Left eye: No discharge.      Conjunctiva/sclera: Conjunctivae normal.   Neck:      Musculoskeletal: Normal range of motion and neck supple.   Cardiovascular:      Rate and Rhythm: Normal rate and regular rhythm.      Heart sounds: No murmur. No friction rub.   Pulmonary:       Effort: Pulmonary effort is normal.      Breath sounds: Normal breath sounds.   Musculoskeletal:         General: Swelling and tenderness present.      Right lower leg: No edema.      Left lower leg: No edema.      Comments: Left middle finger tenderness, swelling. Bruising of palmar surface. Limited ROM due to pain   Skin:     General: Skin is warm and dry.   Neurological:      Mental Status: She is alert and oriented to person, place, and time.   Psychiatric:         Mood and Affect: Mood normal.         Behavior: Behavior normal.         Lab Results   Component Value Date    WBC 9.86 08/02/2019    HGB 12.3 08/02/2019    HCT 38.3 08/02/2019     (H) 08/02/2019    CHOL 212 (H) 08/01/2019    TRIG 209 (H) 08/01/2019    HDL 39 (L) 08/01/2019    ALT 20 08/02/2019    AST 20 08/02/2019     08/02/2019    K 4.1 08/02/2019     08/02/2019    CREATININE 0.8 08/02/2019    BUN 6 08/02/2019    CO2 24 08/02/2019    TSH 2.355 08/01/2019    HGBA1C 5.4 08/01/2019       Assessment:     1. Swelling of left middle finger    2. Injury due to fall, initial encounter      Plan:   Yulissa was seen today for bruised finger.    Diagnoses and all orders for this visit:    Swelling of left middle finger  -     X-Ray Hand 3 View Left; Future  -     POCT Urine Pregnancy  -     Ambulatory referral/consult to Orthopedics; Future    Injury due to fall, initial encounter  -     X-Ray Hand 3 View Left; Future  -     POCT Urine Pregnancy  -     Ambulatory referral/consult to Orthopedics; Future    Ok for ice, tylenol for pain      RTC prn    Janice Lim MD  Internal Medicine    12/8/2020

## 2020-12-09 ENCOUNTER — HOSPITAL ENCOUNTER (OUTPATIENT)
Dept: RADIOLOGY | Facility: HOSPITAL | Age: 25
Discharge: HOME | End: 2020-12-09
Attending: ORTHOPAEDIC SURGERY
Payer: COMMERCIAL

## 2020-12-09 ENCOUNTER — OFFICE VISIT (OUTPATIENT)
Dept: ORTHOPEDICS | Facility: CLINIC | Age: 25
End: 2020-12-09
Payer: COMMERCIAL

## 2020-12-09 VITALS — BODY MASS INDEX: 34.53 KG/M2 | WEIGHT: 187.63 LBS | HEIGHT: 62 IN
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
## 2020-12-09 DIAGNOSIS — M79.89 SWELLING OF LEFT MIDDLE FINGER: ICD-10-CM

## 2020-12-09 DIAGNOSIS — S62.623A CLOSED DISPLACED FRACTURE OF MIDDLE PHALANX OF LEFT MIDDLE FINGER, INITIAL ENCOUNTER: Primary | ICD-10-CM

## 2020-12-09 PROCEDURE — 99203 OFFICE O/P NEW LOW 30 MIN: CPT | Mod: S$GLB,,, | Performed by: ORTHOPAEDIC SURGERY

## 2020-12-09 PROCEDURE — 99999 PR PBB SHADOW E&M-EST. PATIENT-LVL IV: ICD-10-PCS | Mod: PBBFAC,,, | Performed by: ORTHOPAEDIC SURGERY

## 2020-12-09 PROCEDURE — 73140 X-RAY EXAM OF FINGER(S): CPT | Mod: 26,,, | Performed by: RADIOLOGY

## 2020-12-09 PROCEDURE — 99203 PR OFFICE/OUTPT VISIT, NEW, LEVL III, 30-44 MIN: ICD-10-PCS | Mod: S$GLB,,, | Performed by: ORTHOPAEDIC SURGERY

## 2020-12-09 PROCEDURE — 73140 X-RAY EXAM OF FINGER(S): CPT | Mod: TC,PO

## 2020-12-09 PROCEDURE — 73140 XR FINGER 2 OR MORE VIEWS: ICD-10-PCS | Mod: 26,,, | Performed by: RADIOLOGY

## 2020-12-09 PROCEDURE — 99999 PR PBB SHADOW E&M-EST. PATIENT-LVL IV: CPT | Mod: PBBFAC,,, | Performed by: ORTHOPAEDIC SURGERY

## 2020-12-09 NOTE — LETTER
December 11, 2020      Janice Lim MD  [address]  [place] [address]           [place] [facility] Orthopedics 5th Fl  [address]  [place] [address]  Phone: [phone]          Patient: Yulissa Carias   MR Number: [id number]   YOB: 1995   Date of Visit: 12/9/2020       Dear Dr. Janice Lim:    Thank you for referring Yulissa Carias to me for evaluation. Attached you will find relevant portions of my assessment and plan of care.    If you have questions, please do not hesitate to call me. I look forward to following Yulissa Carias along with you.    Sincerely,    Nila Rutledge MD    Enclosure  CC:  No Recipients    If you would like to receive this communication electronically, please contact [email or web address] or [phone] to request more information on [employer] Link access.    For providers and/or their staff who would like to refer a patient to [facility], please contact us through our one-stop-shop provider referral line, [facility], at [phone].    If you feel you have received this communication in error or would no longer like to receive these types of communications, please e-mail [email or web address]

## 2020-12-09 NOTE — PATIENT INSTRUCTIONS
Finger Dislocation  A finger dislocation occurs when the tissues, or ligaments, that hold the joint together are torn. The bones then move apart, or are dislocated, out of their normal position. This causes pain, swelling, and bruising. Sometimes there is also a small chip fracture. Once the joint is put back into place again, it will take about 6 weeks for the ligaments to heal. During this time, you should protect your finger from re-injury.     Buddy taping is a way to secure your injured finger to the one next to it. Buddy taping allows the joint to move. But it also protects it from dislocating again. Buddy tape can be left in place for up to 6 weeks.  Hand exercises may be prescribed at your follow-up visit. These can help speed healing and maintain function. In most cases you will regain full function of your finger. But it may take 12 to 18 months before all mild pain and swelling goes away and full function returns.  Home care  · Keep your hand raised, or elevated, to reduce pain and swelling. When sitting or lying down, raise your arm above the level of your heart. You can do this by placing your arm on a pillow that rests on your chest. Or your arm can be on a pillow at your side. This is most important during the first 48 hours after injury.  · Put an ice pack on the injured area for no more than 15 to 20 minutes every 3 to 6 hours. Do this for the first 24 to 48 hours. You can make an ice pack by wrapping a plastic Ziploc bag of ice cubes in a thin towel. As the ice melts, be careful that the tape, gauze, or splint doesnt get wet. After that, keep using ice as needed to ease pain and swelling.  · If you have a removable splint, you may take it off to bathe and then put it back on. If you have a permanent splint, cover your entire hand with 2 plastic bags. Place 1 bag around the other. Tape each bag with duct tape at the top end. Water can still leak in even when your hand is covered. So it's best to  keep the splint away from water. If a splint gets wet, you can dry it with a hair-dryer on a cool setting.   · If you use taylor tape and it becomes wet or dirty, change it. You may replace it with paper, plastic, or cloth tape. Cloth tape and paper tapes must be kept dry. When re-applying taylor tape, use gauze or cotton padding between your fingers. This will prevent the skin from getting moist and breaking down, or macerating. It is very important to put padding at the web space. This is the small piece of skin that joins the bases of your fingers. Keep the taylor tape in place as instructed by your healthcare provider.  · You may use over-the-counter pain medicine to control pain, unless another pain medicine was prescribed. Talk with your provider before taking these medicines if you have chronic liver or kidney disease. Also talk with your provider if you have ever had a stomach ulcer or GI (gastrointestinal) bleeding.  · Do not play sports or do any physical exercise until your healthcare provider says that you can.  Follow-up care  Follow up with your healthcare provider in 1 week, or as advised. Splints should generally not be left in place longer than 3 weeks to avoid stiffness and loss of joint function. It is important that you see the referral doctor. This provider can determine how long to keep your splint in place and when to begin hand exercises.  If X-rays were taken, you will be told of any new findings that may affect your care.  When to seek medical advice  Call your healthcare provider right away if any of the following occur:  · The injured finger has more pain or swelling  · The injured finger becomes red or warm  · The injured finger becomes cold, blue, numb, or tingly  Date Last Reviewed: 11/23/2015  © 3635-2033 [employer]. [address] [place] [address], [place] [address]. All rights reserved. This information is not intended as a substitute for professional medical care. Always follow  your healthcare professional's instructions.

## 2020-12-09 NOTE — PROGRESS NOTES
[facility]  History & Physical  Orthopedics    SUBJECTIVE:      COVID-19 attestation:  This patient was treated during the COVID-19 pandemic.  This was discussed with the patient, they are aware of our current policies and procedures, were given the option of delaying their visit and or switching to a virtual visit, delaying their surgery when applicable, and they elect to proceed.    Chief Complaint:   Chief Complaint   Patient presents with    Left Hand - Injury       Referring Provider: Janice Lim MD     History of Present Illness:  Patient is a 25 y.o. right hand dominant female who presents today with complaints of left long finger pain and swelling.  She was walking 2 dogs 12/07/2020 when the bigger dog pulled, and she slipped and fell.  She denies any dislocation to the finger.  But noted immediate swelling, bruising and decreased range of motion to the left long finger.  She taylor taped the finger to the ring finger and x-rays on 12/09/2020 revealed a fracture to the base of the middle phalanx.    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    Onset of symptoms/DOI was 12/07/2020.    Symptoms are aggravated by activity and movement.    Symptoms are alleviated by rest and immobilization.    Symptoms consist of pain, swelling and decreased ROM.    The patient rates their pain as a 3/10.    Attempted treatment(s) and/or interventions include rest, activity modification and splinting/casting.     The patient denies any fevers, chills, N/V, D/C and presents for evaluation.       Past Medical History:   Diagnosis Date    Allergy     Seasonal     Asthma     Biliary dyskinesia Jan 2014    lap rubio 1/10/14 Dr. Andrew FERRARA    IBS (irritable bowel syndrome) 2017     Past Surgical History:   Procedure Laterality Date    CHOLECYSTECTOMY      lap rubio  1/10/14    Dr. 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         Family History   Problem Relation Age of Onset    Hypertension Mother     Diabetes Mother         prediabetes    Hyperlipidemia Maternal Grandmother     Hypertension Paternal Grandfather     Celiac disease Maternal Aunt     Sinus disease Father     Anxiety disorder Father     Acne Neg Hx     Melanoma Neg Hx     Psoriasis Neg Hx     Lupus Neg Hx     Eczema Neg Hx     Breast cancer Neg Hx     Colon cancer Neg Hx     Ovarian cancer Neg Hx          Current Outpatient Medications:     cyanocobalamin (VITAMIN B-12) 500 MCG tablet, Take 500 mcg by mouth once daily., Disp: , Rfl:     dicyclomine (BENTYL) 10 MG capsule, TAKE 1 CAPSULE(10 MG) BY MOUTH THREE TIMES DAILY, Disp: 120 capsule, Rfl: 0    escitalopram oxalate (LEXAPRO) 10 MG tablet, TAKE 1 TABLET BY MOUTH EVERY DAY, Disp: 30 tablet, Rfl: 2    fexofenadine (ALLEGRA) 60 MG tablet, Take 60 mg by mouth once daily., Disp: , Rfl:     fluticasone (FLONASE) 50 mcg/actuation nasal spray, 1 spray by Nasal route once daily., Disp: 16 g, Rfl: 3    JUNEL FE 1/20, 28, 1 mg-20 mcg (21)/75 mg (7) per tablet, TAKE 1 TABLET BY MOUTH EVERY DAY, Disp: 84 tablet, Rfl: 0    multivitamin capsule, Take 1 capsule by mouth once daily., Disp: , Rfl:     sumatriptan (IMITREX) 50 MG tablet, Take 1 tablet (50 mg total) by mouth once. 1 tablet at onset of migraine; may repeat in 2 hours.  Not more than 2 tablets in 24 hours. for 1 dose, Disp: 30 tablet, Rfl: 0    ROS    Review of Systems:  Constitutional: no fever or chills  Eyes: no visual changes  ENT: no nasal congestion or sore throat  Respiratory: no cough or shortness of breath  Cardiovascular: no chest pain  Gastrointestinal: no nausea or vomiting, tolerating diet  Musculoskeletal: pain and decreased ROM    OBJECTIVE:      Vital Signs (Most Recent):  Vitals:    12/09/20 1430   Weight:  "85.1 kg (187 lb 9.8 oz)   Height: 5' 2" (1.575 m)     Body mass index is 34.31 kg/m².    Physical Exam    Physical Exam:  Constitutional: The patient appears well-developed and well-nourished. No distress.   Head: Normocephalic and atraumatic.   Nose: Nose normal.   Eyes: Conjunctivae and EOM are normal.   Neck: No tracheal deviation present.   Cardiovascular: Normal rate and intact distal pulses.    Pulmonary/Chest: Effort normal. No respiratory distress.   Abdominal: There is no guarding.   Lymphatic: Negative for adenopathy   Neurological: The patient is alert.   Psychiatric: The patient has a normal mood and affect.     Cervical Exam:  ROM full, supple  Negative Spurling's  Negative Goss's    Bilateral Hand/Wrist Examination:    Observation/Inspection:  Swelling  left long finger    Deformity  none  Discoloration  left long finger    Scars   none    Atrophy  none    HAND/WRIST EXAMINATION:  Snuff box tenderness   Neg  Hook of Hamate Tenderness  Neg  CMC grind    Neg  Circumduction test   Neg  TFCC Compression Test  Neg   strength normal    Decreased flexion and extension of left long finger PIP joint, tenderness to palpation at PIP joint, no varus or valgus instability,  otherwise bilateral ROM hand/wrist/elbow full, painless    Neurovascular Exam:  Digits WWP, brisk CR < 3s throughout  2+ biceps and brachioradialis reflexes  NVI motor/LTS to M/R/U nerves, radial pulse 2+  Tinel's Test - Carpal Tunnel                Pos left  Tinel's Test - Cubital Tunnel               Neg  Phalen's Test                                      Neg  Median Nerve Compression Test       Pos left    Diagnostic Results:     X-Ray Finger 2 View or More Views  EXAMINATION:  XR FINGER 2 OR MORE VIEWS    CLINICAL HISTORY:  Other specified soft tissue disorders    FINDINGS:  Finger three views: There is a small chip avulsion fracture from the intra-articular proximal anterior base of the middle phalanx of the 3rd " digit.    Electronically signed by: Carlos Yañez MD  Date:    12/09/2020  Time:    15:12     Comments: I have personally reviewed the imaging and I agree with the above radiologist's report.      ASSESSMENT/PLAN:      25 y.o. yo female with   Encounter Diagnoses   Name Primary?    Closed displaced fracture of middle phalanx of left middle finger, initial encounter Yes    Injury due to fall, initial encounter       Plan:  I have placed her in a dorsal blocking splint in 30° of flexion.  I have referred her to OT to have a custom thermoplastic splint made.  She may begin gentle flexion exercises within the confines of the splint and light use of the left hand.  She has mild carpal tunnel symptoms, this may be from swelling to the hand.  I will check this at her next visit.    I will see her back in 3 weeks with new x-rays out of her splint or sooner for any worsening of symptoms.        Nila Rutledge MD     Please be aware that this note has been generated with the assistance of [employer] voice-to-text.  Please excuse any spelling or grammatical errors.

## 2020-12-13 ENCOUNTER — PATIENT MESSAGE (OUTPATIENT)
Dept: ORTHOPEDICS | Facility: CLINIC | Age: 25
End: 2020-12-13

## 2020-12-14 ENCOUNTER — PATIENT MESSAGE (OUTPATIENT)
Dept: ORTHOPEDICS | Facility: CLINIC | Age: 25
End: 2020-12-14

## 2020-12-15 ENCOUNTER — CLINICAL SUPPORT (OUTPATIENT)
Dept: REHABILITATION | Facility: HOSPITAL | Age: 25
End: 2020-12-15
Attending: ORTHOPAEDIC SURGERY
Payer: COMMERCIAL

## 2020-12-15 DIAGNOSIS — M25.60 DECREASED RANGE OF MOTION: ICD-10-CM

## 2020-12-15 DIAGNOSIS — S62.623A CLOSED DISPLACED FRACTURE OF MIDDLE PHALANX OF LEFT MIDDLE FINGER, INITIAL ENCOUNTER: ICD-10-CM

## 2020-12-15 PROCEDURE — L3933 FO W/O JOINTS CF: HCPCS

## 2020-12-15 PROCEDURE — 97165 OT EVAL LOW COMPLEX 30 MIN: CPT

## 2020-12-15 PROCEDURE — 97110 THERAPEUTIC EXERCISES: CPT

## 2020-12-15 NOTE — PLAN OF CARE
"  [facility] Occupational Therapy  Initial Evaluation     Name: Yulissa Carias  Clinic Number: [id number]    Therapy Diagnosis:   Encounter Diagnosis   Name Primary?    Closed displaced fracture of middle phalanx of left middle finger, initial encounter      Physician: Nila Rutledge MD    Physician Orders: [id number] - Ambulatory referral/consult to Physical/Occupational Therapy   Other Comments: Hand Therapy ([facility]) Comment - dorsal blocking splint in 30 degrees of flexion left long finger, can begin flexion exercises within confines of splint   Medical Diagnosis: S62.623A (ICD-10-CM) - Closed displaced fracture of middle phalanx of left middle finger, initial encounter   Surgical Procedure and Date: n/a  Evaluation Date: 12/15/2020  Insurance: AETNA/AETNA CHOICE POS   Insurance Authorization period Expiration: 12/31/2020   Plan of Care Certification Period: 12/15/2020 to 2/15/2021    Visit # / Visits Authortized: 1 / 12  Time In: 2:30 pm   Time Out: 3:10 pm  Total Billable Time: 40 minutes    Precautions: Standard    Subjective     Involved Side: left middle finger  Dominant Side: Right  Date of Onset: 12/7/2020  Mechanism of Injury: "I was walking the dogs and one of them got off the leash so I was distracted and the other dog pulled on the leash with my finger in it."  History of Current Condition: Pt reports she went to the Dr the day after her injury and had an x-ray then scheduled surgery.  Imaging: X-ray 12/09/2020   Finger three views: There is a small chip avulsion fracture from the intra-articular proximal anterior base of the middle phalanx of the 3rd digit   Previous Therapy: No    Patient's Goals for Therapy: "I just want all the movement back."    Pain:  Functional Pain Scale Rating 0-10:   1/10 on average  0/10 at best  3/10 at worst  Location: left middle finger   Description: Aching and Dull  Aggravating Factors: Movement or when I bump it on something  Easing Factors: " rest    Occupation:  Student  Working presently: n/a  Duties: Computer work, typing     Functional Limitations/Social History:    Previous functional status includes: Independent with all ADLs.     Current FunctionalStatus   Home/Living environment : lives with their family      Limitation of Functional Status as follows:   ADLs/IADLs:     - Feeding:Idependent    - Bathing: Independent    - Dressing/Grooming: Independent    - Driving: Not currently driving     Leisure: Walking dogs, working out      Past Medical History/Physical Systems Review:   Yulissa Carias  has a past medical history of Allergy, Asthma, Biliary dyskinesia, and IBS (irritable bowel syndrome).    Yulissa Carias  has a past surgical history that includes lap rubio (1/10/14) and Cholecystectomy.    Yulissa has a current medication list which includes the following prescription(s): cyanocobalamin, dicyclomine, escitalopram oxalate, fexofenadine, fluticasone propionate, junel fe 1/20 (28), multivitamin, and sumatriptan.    Review of patient's allergies indicates:  No Known Allergies       Objective   Observation/Appearance:  Edema present and pt with a dorsal alumifoam splint.    Edema. Measured in centimeters.   12/15/2020 12/15/2020    Left Right   Long:       P1 7.0 6.5    PIP 6.2 6.0   P2 5.8 5.4    DIP 5.0 4.5   P3 4.5 4.0     Hand ROM. Measured in degrees.   12/15/2020 12/15/2020    Left Right        Index: DPC touch touch        Long:  MP 0/60 0/80              PIP 30/45 0/90              DIP 0/20 0/70              DODSON 95 240        Ring:   DPC touch touch        Small:  DPC touch touch        Thumb: DPC touch touch     Sensation: Intact         Strength (Dyanmometer) and Pinch Strength (Pinch Gauge)  Measured in pounds and psi. Average of three trials.   12/15/2020 12/15/2020    Left Right   Rung II Def Def   Chilel Pinch Def Def   3pt Pinch Def Def   2pt Pinch Def Def       CMS Impairment/Limitation/Restriction for FOTO Hand  Survey    Therapist reviewed FOTO scores for Yulissa Carias on 12/15/2020.   FOTO documents entered into [employer] - see Media section.    Limitation Score: 37%  Category: Carrying    Current : CJ = at least 20% but < 40% impaired, limited or restricted  Goal: CJ = at least 20% but < 40% impaired, limited or restricted         Treatment     Treatment Time In: 2:30 pm  Treatment Time Out: 3:10 pm   Total Treatment time separate from Evaluation time: 10 minutes    Yulissa received the following supervised modalities after being cleared for contradictions for 0 minutes:   -NT    Yulissa received the following direct contact modalities after being cleared for contraindications for 0 minutes:  -NT    Yulissa received the following manual therapy techniques for 0 minutes:   -NT    Yulissa received therapeutic exercises for 10 minutes including:  -AROM DIP flex/ext, PIP flex/ext, composite flexion x 10 reps (within confines of orthosis blocking PIP at -30 degrees ext)    Yulissa participated in dynamic functional therapeutic activities to improve functional performance for 0  minutes, including:  -NT    *Custom fabricated dorsal blocking orthosis for left long finger with PIP in -30 degrees extension made for pt today.     Home Exercise Program/Education:  Issued HEP (see patient instructions in EMR) and educated on modality use for pain management . Exercises were reviewed and Yulissa was able to demonstrate them prior to the end of the session.   Pt received a written copy of exercises to perform at home. Yulissa demonstrated good  understanding of the education provided.  Pt was advised to perform these exercises free of pain, and to stop performing them if pain occurs.    Patient/Family Education: role of OT, goals for OT, scheduling/cancellations - pt verbalized understanding. Discussed insurance limitations with patient.    Additional Education provided: Wear, care, and precautions of L long finger dorsal blocking  "orthosis.    Assessment     Yulissa Carias is a 25 y.o. female referred to outpatient occupational/hand therapy and presents with a medical diagnosis of closed displaced fracture of middle phalanx of left middle finger, resulting in decreased range of motion, decrease strength, pain, and demonstrates limitations as described in the chart below. Following brief medical record review it is determined that pt will benefit from occupational therapy services in order to maximize pain free and/or functional use of left long finger.     The patient's rehab potential is Excellent.     Anticipated barriers to occupational therapy: none  Pt has no cultural, educational or language barriers to learning provided.    Profile and History Assessment of Occupational Performance Level of Clinical Decision Making Complexity Score   Occupational Profile:   Yulissa Carias is a 25 y.o. female who lives with their family and is currently a student. Yulissa Carias has difficulty with  typing  computer work  affecting his/her daily functional abilities. His/her main goal for therapy is "to get all movement back."     Comorbidities:    has a past medical history of Allergy, Asthma, Biliary dyskinesia, and IBS (irritable bowel syndrome).        Medical and Therapy History Review:   Brief               Performance Deficits    Physical:  Joint Mobility  Joint Stability  Muscle Power/Strength  Skin Integrity/Scar Formation  Edema   Strength  Pinch Strength  Fine Motor Coordination  Pain    Cognitive:  No Deficits    Psychosocial:    No Deficits     Clinical Decision Making:  low    Assessment Process:  Problem-Focused Assessments    Modification/Need for Assistance:  Not Necessary    Intervention Selection:  Limited Treatment Options       low  Based on PMHX, co morbidities , data from assessments and functional level of assistance required with task and clinical presentation directly impacting function.       The following goals " "were discussed with the patient and patient is in agreement with them as to be addressed in the treatment plan.     Goals:   Short Term (4 weeks on 1/15/2020):  1)   Patient to be IND with HEP and modalities for pain management.  2)   Increase ROM 10-20 degrees for PIP ext to increase functional hand use for typing.  3)   Measure  strength in 6 weeks.   4)   Decrease edema .2-.3 cm to increase joint mobility /flexibility for improved overall functional hand use.     Long Term (by discharge):  1)   Pt will report 0 out of 10 pain with left hand use.  2)   Patient to score at CJ or more on FOTO to demonstrate improved perception of functional left hand use.  3)   Pt will return to prior level of function for ADLs and household management.       Plan   Certification Period/Plan of care expiration: 12/15/2020 to 2/15/2020.    Outpatient Occupational Therapy 1 times weekly for 8 weeks may include the following interventions: Paraffin, Fluidotherapy, Manual therapy/joint mobilizations, Modalities for pain management, Therapeutic exercises/activities., Strengthening and Edema Control.    Student: CARLOTTA Griffin    " I certify that I was present in the room directing the student in service delivery and guiding them using my skilled judgement. As the co-signing therapist, I have reviewed the student's documentation and am responsible for the treatment, assessment and plan."    Therapist: SHANDA Wolfe, JONI Nance, RUTH          "

## 2020-12-15 NOTE — PATIENT INSTRUCTIONS
"[facility] - OCCUPATIONAL THERAPY  HOME EXERCISE PROGRAM       Complete the following exercises with 10 repetitions each, 4x/day.     AROM: DIP Flexion / Extension  Pinch middle knuckle to prevent bending. Bend end knuckle until stretch is felt.   Hold 3 seconds. Relax. Straighten finger only to 30 degrees PIP extension.  AROM: PIP Flexion / Extension  Pinch bottom knuckle  to prevent bending. Actively bend middle knuckle until stretch is felt.   Hold 3 seconds. Relax. Straighten finger only to 30 degrees PIP extension.      AROM: Composite Flexion / Extension ("Full Fist")  Bend every joint in your hand into a fist. Hold 3 seconds. Straighten your middle finger to only 30 degrees PIP extension.        Therapist: SHANDA Wolfe CHT    "

## 2020-12-23 ENCOUNTER — CLINICAL SUPPORT (OUTPATIENT)
Dept: REHABILITATION | Facility: HOSPITAL | Age: 25
End: 2020-12-23
Attending: ORTHOPAEDIC SURGERY
Payer: COMMERCIAL

## 2020-12-23 DIAGNOSIS — M25.60 DECREASED RANGE OF MOTION: ICD-10-CM

## 2020-12-23 PROCEDURE — 97110 THERAPEUTIC EXERCISES: CPT

## 2020-12-23 NOTE — PROGRESS NOTES
"  Occupational Therapy Daily Treatment Note      Date: 12/23/2020  Name: Yulissa Carias  Clinic Number: [id number]    Therapy Diagnosis:   Encounter Diagnosis   Name Primary?    Decreased range of motion      Physician: Nila Rutledge MD    Physician Orders: [id number] - Ambulatory referral/consult to Physical/Occupational Therapy   Other Comments: Hand Therapy (CHT) Comment - dorsal blocking splint in 30 degrees of flexion left long finger, can begin flexion exercises within confines of splint   Medical Diagnosis: S62.623A (ICD-10-CM) - Closed displaced fracture of middle phalanx of left middle finger, initial encounter   Surgical Procedure and Date: n/a  Evaluation Date: 12/15/2020  Insurance: AET[employer] CHOICE POS   Insurance Authorization period Expiration: 12/31/2020   Plan of Care Certification Period: 12/15/2020 to 2/15/2021  Date of Return to MD: 12/28/2020     FOTO: initial eval    Visit # / Visits authorized ( including initial evaluation)  2 / 12  Time In: 11:38 am (Pt arrived 8 minutes late to appointment)   Time Out: 12:15 pm  Total Treatment Time: 37 minutes  Total Billable Time: 37 minutes    Precautions:  Standard      Subjective     Pt reports: "It's definitely doing better. I've been wearing the splint with no problems. It's definitely not as tender when I accidentally bump it."   she was compliant with home exercise program given last session.   Response to previous treatment: Good - improved motion, decreased edema, decreased pain  Functional change: less discomfort with light ADLs    Pain: 0/10  Location: left long finger     Objective     Observation/Appearance:  Pt arrived with dorsal blocking splint and coban wrap to left long finger. Decreased edema in left long finger noted today.     Edema. Measured in centimeters.    12/15/2020 12/15/2020 12/23/2020     Left Right Left   Long:          P1 7.0 6.5 6.6    PIP 6.2 6.0 6.0   P2 5.8 5.4 5.3    DIP 5.0 4.5 4.6   P3 4.5 4.0 4.1      Hand ROM. " Measured in degrees.    12/15/2020 12/15/2020     Left Right           Index: DPC touch touch           Long:  MP 0/60 0/80              PIP 30/45 0/90              DIP 0/20 0/70              DODSON 95 240           Ring:   DPC touch touch           Small:  DPC touch touch           Thumb: DPC touch touch      Sensation: Intact        Strength (Dyanmometer) and Pinch Strength (Pinch Gauge)  Measured in pounds and psi. Average of three trials.    12/15/2020 12/15/2020     Left Right   Rung II Def Def   Chilel Pinch Def Def   3pt Pinch Def Def   2pt Pinch Def Def         CMS Impairment/Limitation/Restriction for FOTO Hand Survey     Therapist reviewed FOTO scores for Yulissa Carias on 12/15/2020.   FOTO documents entered into [employer] - see Media section.     Limitation Score: 37%  Category: Carrying     Current : CJ = at least 20% but < 40% impaired, limited or restricted  Goal: CJ = at least 20% but < 40% impaired, limited or restricted        Yulissa received the following supervised modalities after being cleared for contradictions for 0 minutes:   -nt due to time    Yulissa received the following direct contact modalities after being cleared for contraindications for 0 minutes:  -nt    Yulissa received the following manual therapy techniques for 0 minutes:   -nt    Yulissa received therapeutic exercises for 37 minutes including:  -Updated edema measurements, see above.   -AROM DIP flex/ext, PIP flex/ext, composite flexion x 10 reps (within confines of orthosis blocking PIP at -20 degrees ext)  -Custom fabricated dorsal blocking orthosis for left long finger adjusted to PIP in -20 degrees extension and to accommodate for decreased edema     Yulissa participated in dynamic functional therapeutic activities to improve functional performance for 0 minutes, including:  -nt    Home Exercises and Education Provided     Education provided: cont prior HEP provided with modifications, pt now able to extend PIP to -20 deg with AROM  exercises.  - Progress towards goals: Good    Written Home Exercises Provided: Patient instructed to cont prior HEP.  Exercises were reviewed and Yulissa was able to demonstrate them prior to the end of the session.  Yulissa demonstrated good  understanding of the education provided.     See EMR under Patient Instructions for exercises provided initial eval.     Assessment     Yulissa Carias is a 25 y.o. female referred to outpatient occupational/hand therapy and presents with a medical diagnosis of closed displaced fracture of middle phalanx of left middle finger, resulting in decreased range of motion, decrease strength, and pain. Pt had good tolerance to treatment today and reports good adherence to orthosis wear and HEP. She presents with decreased edema today and endorses decreased pain to left LF with light activities. Orthosis was re-adjusted and good fit achieved. Pt demonstrated independence with donning and doffing of orthosis with precautions. Overall, she is progressing well towards personal goals.    Yulissa is progressing well towards her goals and there are no updates to goals at this time. Pt prognosis continues as Excellent. Pt will continue to benefit from skilled outpatient occupational therapy to address the deficits listed in the problem list on initial evaluation, provide pt/family education and to maximize pt's level of independence in the home and community environment.     Anticipated barriers to continued occupational therapy: none    Pt's spiritual, cultural and educational needs considered and pt agreeable to plan of care and goals.    Goals     Goals:   Short Term (4 weeks on 1/15/2020):  1)   Patient to be IND with HEP and modalities for pain management. ------progressing not met 12/23/2020  2)   Increase ROM 10-20 degrees for PIP ext to increase functional hand use for typing. ------progressing not met 12/23/2020  3)   Measure  strength in 6 weeks. ------progressing not met  12/23/2020  4)   Decrease edema .2-.3 cm to increase joint mobility /flexibility for improved overall functional hand use. MET 12/23/2020     Long Term (by discharge):  1)   Pt will report 0 out of 10 pain with left hand use. ------progressing not met 12/23/2020  2)   Patient to score at CJ or more on FOTO to demonstrate improved perception of functional left hand use. ------progressing not met 12/23/2020  3)   Pt will return to prior level of function for ADLs and household management. ------progressing not met 12/23/2020     Plan     Continue skilled occupational therapy with individualized plan of care focusing on increasing AROM and functional use of left hand.    Updates/Grading for next session: adjust orthosis to PIP in -10 deg ext    Susan Smith, OT

## 2020-12-28 ENCOUNTER — OFFICE VISIT (OUTPATIENT)
Dept: ORTHOPEDICS | Facility: CLINIC | Age: 25
End: 2020-12-28
Payer: COMMERCIAL

## 2020-12-28 ENCOUNTER — HOSPITAL ENCOUNTER (OUTPATIENT)
Dept: RADIOLOGY | Facility: HOSPITAL | Age: 25
Discharge: HOME | End: 2020-12-28
Attending: ORTHOPAEDIC SURGERY
Payer: COMMERCIAL

## 2020-12-28 DIAGNOSIS — S62.623A CLOSED DISPLACED FRACTURE OF MIDDLE PHALANX OF LEFT MIDDLE FINGER, INITIAL ENCOUNTER: ICD-10-CM

## 2020-12-28 DIAGNOSIS — S62.623A CLOSED DISPLACED FRACTURE OF MIDDLE PHALANX OF LEFT MIDDLE FINGER, INITIAL ENCOUNTER: Primary | ICD-10-CM

## 2020-12-28 PROCEDURE — 99999 PR PBB SHADOW E&M-EST. PATIENT-LVL III: CPT | Mod: PBBFAC,,, | Performed by: ORTHOPAEDIC SURGERY

## 2020-12-28 PROCEDURE — 99212 PR OFFICE/OUTPT VISIT, EST, LEVL II, 10-19 MIN: ICD-10-PCS | Mod: S$GLB,,, | Performed by: ORTHOPAEDIC SURGERY

## 2020-12-28 PROCEDURE — 73140 X-RAY EXAM OF FINGER(S): CPT | Mod: TC,PO,LT

## 2020-12-28 PROCEDURE — 99999 PR PBB SHADOW E&M-EST. PATIENT-LVL III: ICD-10-PCS | Mod: PBBFAC,,, | Performed by: ORTHOPAEDIC SURGERY

## 2020-12-28 PROCEDURE — 73140 XR FINGER 2 OR MORE VIEWS LEFT: ICD-10-PCS | Mod: 26,LT,, | Performed by: RADIOLOGY

## 2020-12-28 PROCEDURE — 99212 OFFICE O/P EST SF 10 MIN: CPT | Mod: S$GLB,,, | Performed by: ORTHOPAEDIC SURGERY

## 2020-12-28 PROCEDURE — 73140 X-RAY EXAM OF FINGER(S): CPT | Mod: 26,LT,, | Performed by: RADIOLOGY

## 2020-12-28 NOTE — PROGRESS NOTES
Yulissa Carias presents for follow-up of her.  The patient is now 3 weeks s/p left long finger volar plate injury.  Overall the patient reports doing well.  The patient reports appropriate soreness with well controlled overall pain. She has been wearing her dorsal blocking splint and working on gentle range-of-motion exercises.    PE:    AA&O x 4.  NAD  HEENT:  NCAT, sclera nonicteric  Lungs:  Respirations are equal and unlabored.  CV:  2+ bilateral upper and lower extremity pulses.  MSK:  She has flexion to 90° at the PIP joint, no instability to varus or valgus stress, neurovascularly intact with 5/5 intrinsic and extrinsic musculotendinous unit strength    X-Ray Finger 2 or More Views Left  EXAMINATION:  XR FINGER 2 OR MORE VIEWS LEFT    CLINICAL HISTORY:  Displaced fracture of middle phalanx of left middle finger, initial encounter for closed fracture    FINDINGS:  Three views finger left: No fracture dislocation bone destruction seen.    Electronically signed by: Carlos Yañez MD  Date:    12/28/2020  Time:    15:30        A/P: Status post above, doing well  1) Continue with light use of the left hand, discontinue dorsal blocking splint, continue therapy to work on range of motion, can start strengthening at 6 weeks post injury  2) F/U 4-5 weeks  3) Call with any questions/concerns in the interim     Please be aware that this note has been generated with the assistance of [employer] voice-to-text.  Please excuse any spelling or grammatical errors.

## 2021-02-15 RX ORDER — ESCITALOPRAM OXALATE 10 MG/1
10 TABLET ORAL DAILY
Qty: 30 TABLET | Refills: 2 | Status: SHIPPED | OUTPATIENT
Start: 2021-02-15 | End: 2021-05-18

## 2022-01-04 NOTE — PROGRESS NOTES
Subjective:       Patient ID: Yulisas Carias is a 22 y.o. female.    Chief Complaint: Annual Exam (physical)    Patient is a 22 y.o.female who presents today for annual.      Cholesterol: (due now)  Vaccines: Influenza (yearly); Tetanus (2007)   Gyn exam: dr. pierre  Exercise:  Diet:  LMP: regular    Bowels: symptoms change; some is gas and bloating and constipation; then sometimes she is constantly going to the bathroom    Past Medical History:   Diagnosis Date    Allergy     Seasonal     Asthma     Biliary dyskinesia Jan 2014    lap rubio 1/10/14 Dr. Morales [facility] [place]     Past Surgical History:   Procedure Laterality Date    CHOLECYSTECTOMY      lap rubio  1/10/14    Dr. Andrew FERRARA - Biliary dyskinesia/chronic cholecystitis     Social History     Social History    Marital status: Single     Spouse name: N/A    Number of children: N/A    Years of education: N/A     Occupational History    Student       [employer]     Social History Main Topics    Smoking status: Never Smoker    Smokeless tobacco: Never Used    Alcohol use No    Drug use: No    Sexual activity: No     Other Topics Concern    Are You Pregnant Or Think You May Be? No    Breast-Feeding No     Social History Narrative    Goes to [employer] for [employer]             Review of patient's allergies indicates:  No Known Allergies  Ms. Carias had no medications administered during this visit.    Review of Systems   Constitutional: Negative for activity change and unexpected weight change.   HENT: Negative for hearing loss, rhinorrhea and trouble swallowing.    Eyes: Negative for discharge and visual disturbance.   Respiratory: Negative for chest tightness and wheezing.    Cardiovascular: Negative for chest pain and palpitations.   Gastrointestinal: Positive for constipation and diarrhea. Negative for blood in stool and vomiting.   Endocrine: Negative for polydipsia and polyuria.   Genitourinary: Negative for difficulty  urinating, dysuria, hematuria and menstrual problem.   Musculoskeletal: Positive for neck pain. Negative for arthralgias and joint swelling.   Neurological: Positive for headaches. Negative for weakness.   Psychiatric/Behavioral: Negative for confusion and dysphoric mood.       Objective:      Physical Exam   Constitutional: She is oriented to person, place, and time. She appears well-developed and well-nourished. No distress.   HENT:   Head: Normocephalic and atraumatic.   Right Ear: External ear normal.   Left Ear: External ear normal.   Nose: Nose normal.   Mouth/Throat: Oropharynx is clear and moist. No oropharyngeal exudate.   Eyes: Conjunctivae and EOM are normal. Pupils are equal, round, and reactive to light. Right eye exhibits no discharge. Left eye exhibits no discharge. No scleral icterus.   Neck: Normal range of motion. Neck supple. No JVD present. No thyromegaly present.   Cardiovascular: Normal rate, regular rhythm, normal heart sounds and intact distal pulses.  Exam reveals no gallop and no friction rub.    No murmur heard.  Pulmonary/Chest: Effort normal and breath sounds normal. No stridor. No respiratory distress. She has no wheezes. She has no rales. She exhibits no tenderness.   Abdominal: Soft. Bowel sounds are normal. She exhibits no distension. There is no tenderness. There is no rebound.   Musculoskeletal: Normal range of motion. She exhibits no edema or tenderness.   Lymphadenopathy:     She has no cervical adenopathy.   Neurological: She is alert and oriented to person, place, and time. No cranial nerve deficit.   Skin: Skin is warm and dry. No rash noted. She is not diaphoretic. No erythema.   Psychiatric: She has a normal mood and affect. Her behavior is normal.   Nursing note and vitals reviewed.      Assessment and Plan:       1. Annual physical exam    - CBC auto differential; Future  - Comprehensive metabolic panel; Future  - TSH; Future  - Lipid panel; Future  - Vitamin D;  Future    2. Irritable bowel syndrome, unspecified type  - discussed food diary  - discussed elimination diet from dairy and wheat and slow re-introduction  - bentyl prn  - probiotic daily          No Follow-up on file.   [Post-Menopause, No Sxs] : post-menopausal, currently without symptoms [___] : Total Pregnancies: [unfilled]

## 2022-01-18 ENCOUNTER — PATIENT MESSAGE (OUTPATIENT)
Dept: ADMINISTRATIVE | Facility: HOSPITAL | Age: 27
End: 2022-01-18
Payer: COMMERCIAL

## 2022-03-16 ENCOUNTER — PATIENT MESSAGE (OUTPATIENT)
Dept: ADMINISTRATIVE | Facility: HOSPITAL | Age: 27
End: 2022-03-16
Payer: COMMERCIAL

## 2022-05-11 ENCOUNTER — PATIENT OUTREACH (OUTPATIENT)
Dept: ADMINISTRATIVE | Facility: HOSPITAL | Age: 27
End: 2022-05-11
Payer: COMMERCIAL

## 2022-05-23 ENCOUNTER — OFFICE VISIT (OUTPATIENT)
Dept: PRIMARY CARE CLINIC | Facility: CLINIC | Age: 27
End: 2022-05-23
Payer: COMMERCIAL

## 2022-05-23 DIAGNOSIS — L02.416 ABSCESS OF LEFT LEG: Primary | ICD-10-CM

## 2022-05-23 PROCEDURE — 99213 PR OFFICE/OUTPT VISIT, EST, LEVL III, 20-29 MIN: ICD-10-PCS | Mod: 95,,, | Performed by: NURSE PRACTITIONER

## 2022-05-23 PROCEDURE — 99213 OFFICE O/P EST LOW 20 MIN: CPT | Mod: 95,,, | Performed by: NURSE PRACTITIONER

## 2022-05-23 RX ORDER — SULFAMETHOXAZOLE AND TRIMETHOPRIM 800; 160 MG/1; MG/1
1 TABLET ORAL 2 TIMES DAILY
Qty: 10 TABLET | Refills: 0 | Status: SHIPPED | OUTPATIENT
Start: 2022-05-23 | End: 2022-05-28

## 2022-05-23 NOTE — PROGRESS NOTES
The patient location is: [place]   The chief complaint leading to consultation is: Bump  Visit type: audiovisual    Face to Face time with patient: 10 minutes  15 minutes of total time spent on the encounter, which includes face to face time and non-face to face time preparing to see the patient (eg, review of tests), Obtaining and/or reviewing separately obtained history, Documenting clinical information in the electronic or other health record, Independently interpreting results (not separately reported) and communicating results to the patient/family/caregiver, or Care coordination (not separately reported).     Each patient to whom he or she provides medical services by telemedicine is:  (1) informed of the relationship between the physician and patient and the respective role of any other health care provider with respect to management of the patient; and (2) notified that he or she may decline to receive medical services by telemedicine and may withdraw from such care at any time.    Notes:   Subjective:       Patient ID: Yulissa Carias is a 26 y.o. female.    Chief Complaint: Abscess     HPI     Ms. Carias is a 26 year old female patient that presents for telemedicine visit with a complaint of a pimple/bump on her left leg/inner thigh. Past medical history of irritable bowel syndrome, migraine, biliary dyskinesia, and asthma. Past surgical history of cholecystectomy. PCP is Dr. Wang, she is new to me. Reports bump started approximately 5 days ago that is now red and swollen. Denies fever, chills, rash, or lesion.     ROS as listed.   Past Medical History:   Diagnosis Date    Allergy     Seasonal     Asthma     Biliary dyskinesia Jan 2014    lap rubio 1/10/14 Dr. nAdrew FERRARA    IBS (irritable bowel syndrome) 2017      Past Surgical History:   Procedure Laterality Date    CHOLECYSTECTOMY      lap rubio  1/10/14    Dr. Andrew FERRARA - Biliary dyskinesia/chronic cholecystitis      Family History   Problem  Relation Age of Onset    Hypertension Mother     Diabetes Mother         prediabetes    Hyperlipidemia Maternal Grandmother     Hypertension Paternal Grandfather     Celiac disease Maternal Aunt     Sinus disease Father     Anxiety disorder Father     Acne Neg Hx     Melanoma Neg Hx     Psoriasis Neg Hx     Lupus Neg Hx     Eczema Neg Hx     Breast cancer Neg Hx     Colon cancer Neg Hx     Ovarian cancer Neg Hx       Review of patient's allergies indicates:  No Known Allergies  Review of Systems   Constitutional: Negative for chills and fever.   Skin: Positive for wound (red bump on left leg with swelling and redness).       Objective:      There were no vitals filed for this visit.   Physical Exam  Constitutional:       General: She is not in acute distress.     Appearance: Normal appearance.   Neurological:      Mental Status: She is alert and oriented to person, place, and time.   Psychiatric:         Mood and Affect: Mood normal.         Behavior: Behavior normal.       Limited physical examination due to nature of virtual/telemedicine visit.   Lab Results   Component Value Date    WBC 9.86 08/02/2019    HGB 12.3 08/02/2019    HCT 38.3 08/02/2019     (H) 08/02/2019    CHOL 212 (H) 08/01/2019    TRIG 209 (H) 08/01/2019    HDL 39 (L) 08/01/2019    ALT 20 08/02/2019    AST 20 08/02/2019     08/02/2019    K 4.1 08/02/2019     08/02/2019    CREATININE 0.8 08/02/2019    BUN 6 08/02/2019    CO2 24 08/02/2019    TSH 2.355 08/01/2019    HGBA1C 5.4 08/01/2019      Assessment:       1. Abscess of left leg        Plan:       Abscess of left leg  Discussed with patient that first line treatment for an abscess is incision and drainage. Due to nature of this visit, will rx course of antibiotics. Advised patient to monitor for worsening signs of infection such as redness, firmness, warmth, or worsening swelling.  If symptoms worsen, go to urgent care or ED for I&D.   -      sulfamethoxazole-trimethoprim 800-160mg (BACTRIM DS) 800-160 mg Tab; Take 1 tablet by mouth 2 (two) times daily. for 5 days  Dispense: 10 tablet; Refill: 0      Medication List with Changes/Refills   New Medications    SULFAMETHOXAZOLE-TRIMETHOPRIM 800-160MG (BACTRIM DS) 800-160 MG TAB    Take 1 tablet by mouth 2 (two) times daily. for 5 days   Current Medications    CYANOCOBALAMIN (VITAMIN B-12) 500 MCG TABLET    Take 500 mcg by mouth once daily.    DICYCLOMINE (BENTYL) 10 MG CAPSULE    TAKE 1 CAPSULE(10 MG) BY MOUTH THREE TIMES DAILY    ESCITALOPRAM OXALATE (LEXAPRO) 10 MG TABLET    TAKE 1 TABLET BY MOUTH EVERY DAY    FEXOFENADINE (ALLEGRA) 60 MG TABLET    Take 60 mg by mouth once daily.    FLUTICASONE (FLONASE) 50 MCG/ACTUATION NASAL SPRAY    1 spray by Nasal route once daily.    JUNEL FE 1/20, 28, 1 MG-20 MCG (21)/75 MG (7) PER TABLET    TAKE 1 TABLET BY MOUTH EVERY DAY    MULTIVITAMIN CAPSULE    Take 1 capsule by mouth once daily.    SUMATRIPTAN (IMITREX) 50 MG TABLET    Take 1 tablet (50 mg total) by mouth once. 1 tablet at onset of migraine; may repeat in 2 hours.  Not more than 2 tablets in 24 hours. for 1 dose       Follow up in about 3 days (around 5/26/2022), or if symptoms worsen or fail to improve.    Yadira De Santiago, DNP, APRN, FNP-C

## 2022-06-16 ENCOUNTER — OFFICE VISIT (OUTPATIENT)
Dept: INTERNAL MEDICINE | Facility: CLINIC | Age: 27
End: 2022-06-16
Payer: COMMERCIAL

## 2022-06-16 VITALS
OXYGEN SATURATION: 96 % | WEIGHT: 202.63 LBS | RESPIRATION RATE: 18 BRPM | DIASTOLIC BLOOD PRESSURE: 72 MMHG | BODY MASS INDEX: 37.29 KG/M2 | HEART RATE: 106 BPM | TEMPERATURE: 98 F | SYSTOLIC BLOOD PRESSURE: 110 MMHG | HEIGHT: 62 IN

## 2022-06-16 DIAGNOSIS — J01.40 ACUTE PANSINUSITIS, RECURRENCE NOT SPECIFIED: Primary | ICD-10-CM

## 2022-06-16 DIAGNOSIS — F32.A ANXIETY AND DEPRESSION: ICD-10-CM

## 2022-06-16 DIAGNOSIS — F41.9 ANXIETY AND DEPRESSION: ICD-10-CM

## 2022-06-16 PROCEDURE — 99213 PR OFFICE/OUTPT VISIT, EST, LEVL III, 20-29 MIN: ICD-10-PCS | Mod: S$GLB,,, | Performed by: NURSE PRACTITIONER

## 2022-06-16 PROCEDURE — 99999 PR PBB SHADOW E&M-EST. PATIENT-LVL IV: ICD-10-PCS | Mod: PBBFAC,,, | Performed by: NURSE PRACTITIONER

## 2022-06-16 PROCEDURE — 99999 PR PBB SHADOW E&M-EST. PATIENT-LVL IV: CPT | Mod: PBBFAC,,, | Performed by: NURSE PRACTITIONER

## 2022-06-16 PROCEDURE — 99213 OFFICE O/P EST LOW 20 MIN: CPT | Mod: S$GLB,,, | Performed by: NURSE PRACTITIONER

## 2022-06-16 RX ORDER — ESCITALOPRAM OXALATE 20 MG/1
20 TABLET ORAL DAILY
COMMUNITY
Start: 2022-05-25 | End: 2022-06-16 | Stop reason: SDUPTHER

## 2022-06-16 RX ORDER — LEVOCETIRIZINE DIHYDROCHLORIDE 5 MG/1
5 TABLET, FILM COATED ORAL NIGHTLY
Qty: 30 TABLET | Refills: 11 | Status: SHIPPED | OUTPATIENT
Start: 2022-06-16 | End: 2023-06-07

## 2022-06-16 RX ORDER — ESCITALOPRAM OXALATE 20 MG/1
20 TABLET ORAL DAILY
Qty: 30 TABLET | Refills: 6 | Status: SHIPPED | OUTPATIENT
Start: 2022-06-16

## 2022-06-16 RX ORDER — AMOXICILLIN AND CLAVULANATE POTASSIUM 875; 125 MG/1; MG/1
1 TABLET, FILM COATED ORAL EVERY 12 HOURS
Qty: 14 TABLET | Refills: 0 | Status: SHIPPED | OUTPATIENT
Start: 2022-06-16 | End: 2022-06-23

## 2022-06-16 NOTE — PROGRESS NOTES
Subjective:       Patient ID: Yulissa Carias is a 26 y.o. female.    Chief Complaint: Chest Congestion      Patient is a 26 y.o. female who traditionally follows with Dulce Maria Wang MD presenting today for:    Congestion  States two weeks ago she began with sore throat and sinus congestion. Notes now that she is having chest congestion and sinus pressure. Has been traveling so she has had negative PCR and antigen tests since onset. She does feel fatigued as a result of these symptoms.   She denies SoB, cough, ear pain, fever/chills and body aches.   Is currently taking Zyrtec, Sudafed and Tylenol with little relief.     Review of patient's allergies indicates:  No Known Allergies    Medication List with Changes/Refills   New Medications    AMOXICILLIN-CLAVULANATE 875-125MG (AUGMENTIN) 875-125 MG PER TABLET    Take 1 tablet by mouth every 12 (twelve) hours. for 7 days    LEVOCETIRIZINE (XYZAL) 5 MG TABLET    Take 1 tablet (5 mg total) by mouth every evening.   Current Medications    CYANOCOBALAMIN 500 MCG TABLET    Take 500 mcg by mouth once daily.    DICYCLOMINE (BENTYL) 10 MG CAPSULE    TAKE 1 CAPSULE(10 MG) BY MOUTH THREE TIMES DAILY    FLUTICASONE (FLONASE) 50 MCG/ACTUATION NASAL SPRAY    1 spray by Nasal route once daily.    MULTIVITAMIN CAPSULE    Take 1 capsule by mouth once daily.    SUMATRIPTAN (IMITREX) 50 MG TABLET    Take 1 tablet (50 mg total) by mouth once. 1 tablet at onset of migraine; may repeat in 2 hours.  Not more than 2 tablets in 24 hours. for 1 dose   Changed and/or Refilled Medications    Modified Medication Previous Medication    ESCITALOPRAM OXALATE (LEXAPRO) 20 MG TABLET EScitalopram oxalate (LEXAPRO) 20 MG tablet       Take 1 tablet (20 mg total) by mouth once daily.    Take 20 mg by mouth once daily.   Discontinued Medications    ESCITALOPRAM OXALATE (LEXAPRO) 10 MG TABLET    TAKE 1 TABLET BY MOUTH EVERY DAY    FEXOFENADINE (ALLEGRA) 60 MG TABLET    Take 60 mg by mouth once daily.     "JUNEL FE 1/20, 28, 1 MG-20 MCG (21)/75 MG (7) PER TABLET    TAKE 1 TABLET BY MOUTH EVERY DAY     Medical, social and surgical history has been reviewed with the patient.     Review of Systems   Constitutional: Positive for malaise/fatigue. Negative for chills and fever.   HENT: Positive for congestion and sinus pain. Negative for ear pain and sore throat.    Respiratory: Positive for cough ("mild" - intermittent) and shortness of breath.    Cardiovascular: Negative for chest pain.       Objective:   /72 (BP Location: Right arm, Patient Position: Sitting, BP Method: Medium (Manual))   Pulse 106   Temp 97.5 °F (36.4 °C) (Temporal)   Resp 18   Ht 5' 2" (1.575 m)   Wt 91.9 kg (202 lb 9.6 oz)   SpO2 96%   BMI 37.06 kg/m²       Physical Exam  Vitals reviewed.   Constitutional:       Appearance: Normal appearance.   HENT:      Head: Normocephalic and atraumatic.      Right Ear: Tympanic membrane is bulging.      Left Ear: Tympanic membrane is bulging.      Nose: Rhinorrhea present. Rhinorrhea is clear.      Right Turbinates: Swollen.      Left Turbinates: Swollen.      Right Sinus: Maxillary sinus tenderness present.      Left Sinus: Maxillary sinus tenderness and frontal sinus tenderness present.      Mouth/Throat:      Pharynx: Posterior oropharyngeal erythema present.   Cardiovascular:      Rate and Rhythm: Normal rate and regular rhythm.      Heart sounds: Normal heart sounds. No murmur heard.  Pulmonary:      Effort: Pulmonary effort is normal.      Breath sounds: Normal breath sounds. No wheezing.   Skin:     General: Skin is warm and dry.   Neurological:      Mental Status: She is alert and oriented to person, place, and time.         Last Labs:  Glucose   Date Value Ref Range Status   08/02/2019 105 70 - 110 mg/dL Final   08/01/2019 89 70 - 110 mg/dL Final     BUN   Date Value Ref Range Status   08/02/2019 6 6 - 20 mg/dL Final   08/01/2019 6 6 - 20 mg/dL Final     Creatinine   Date Value Ref Range " Status   08/02/2019 0.8 0.5 - 1.4 mg/dL Final   08/01/2019 0.7 0.5 - 1.4 mg/dL Final     Potassium   Date Value Ref Range Status   08/02/2019 4.1 3.5 - 5.1 mmol/L Final   08/01/2019 3.8 3.5 - 5.1 mmol/L Final     Cholesterol   Date Value Ref Range Status   08/01/2019 212 (H) 120 - 199 mg/dL Final     Comment:     The National Cholesterol Education Program (NCEP) has set the  following guidelines (reference ranges) for Cholesterol:  Optimal.....................<200 mg/dL  Borderline High.............200-239 mg/dL  High........................> or = 240 mg/dL     08/03/2018 192 120 - 199 mg/dL Final     Comment:     The National Cholesterol Education Program (NCEP) has set the  following guidelines (reference ranges) for Cholesterol:  Optimal.....................<200 mg/dL  Borderline High.............200-239 mg/dL  High........................> or = 240 mg/dL       Hemoglobin A1C   Date Value Ref Range Status   08/01/2019 5.4 4.0 - 5.6 % Final     Comment:     ADA Screening Guidelines:  5.7-6.4%  Consistent with prediabetes  >or=6.5%  Consistent with diabetes  High levels of fetal hemoglobin interfere with the HbA1C  assay. Heterozygous hemoglobin variants (HbS, HgC, etc)do  not significantly interfere with this assay.   However, presence of multiple variants may affect accuracy.     08/03/2018 5.2 4.0 - 5.6 % Final     Comment:     ADA Screening Guidelines:  5.7-6.4%  Consistent with prediabetes  >or=6.5%  Consistent with diabetes  High levels of fetal hemoglobin interfere with the HbA1C  assay. Heterozygous hemoglobin variants (HbS, HgC, etc)do  not significantly interfere with this assay.   However, presence of multiple variants may affect accuracy.       Hemoglobin   Date Value Ref Range Status   08/02/2019 12.3 12.0 - 16.0 g/dL Final   08/01/2019 12.9 12.0 - 16.0 g/dL Final     Hematocrit   Date Value Ref Range Status   08/02/2019 38.3 37.0 - 48.5 % Final   08/01/2019 41.4 37.0 - 48.5 % Final     Vit D,  25-Hydroxy   Date Value Ref Range Status   08/09/2017 45 30 - 96 ng/mL Final     Comment:     Vitamin D deficiency.........<10 ng/mL                              Vitamin D insufficiency......10-29 ng/mL       Vitamin D sufficiency........> or equal to 30 ng/mL  Vitamin D toxicity............>100 ng/mL     08/19/2016 33 30 - 96 ng/mL Final     Comment:     Vitamin D deficiency.........<10 ng/mL                              Vitamin D insufficiency......10-29 ng/mL       Vitamin D sufficiency........> or equal to 30 ng/mL  Vitamin D toxicity............>100 ng/mL         I have reviewed the following:     Details / Date    [x]   Labs     []   Micro     []   Pathology     []   Imaging     []   Cardiology Procedures     []   Other        Assessment and Plan:     1. Acute pansinusitis, recurrence not specified    - amoxicillin-clavulanate 875-125mg (AUGMENTIN) 875-125 mg per tablet; Take 1 tablet by mouth every 12 (twelve) hours. for 7 days  Dispense: 14 tablet; Refill: 0  - levocetirizine (XYZAL) 5 MG tablet; Take 1 tablet (5 mg total) by mouth every evening.  Dispense: 30 tablet; Refill: 11    Patient advised she could continue Tylenol and Sudafed. Add in Mucinex and increased hydration if needed for persistent chest congestion.     2. Anxiety and depression    - EScitalopram oxalate (LEXAPRO) 20 MG tablet; Take 1 tablet (20 mg total) by mouth once daily.  Dispense: 30 tablet; Refill: 6

## 2023-06-07 ENCOUNTER — OFFICE VISIT (OUTPATIENT)
Dept: URGENT CARE | Facility: CLINIC | Age: 28
End: 2023-06-07
Payer: COMMERCIAL

## 2023-06-07 VITALS
OXYGEN SATURATION: 98 % | RESPIRATION RATE: 20 BRPM | BODY MASS INDEX: 37.17 KG/M2 | TEMPERATURE: 99 F | DIASTOLIC BLOOD PRESSURE: 82 MMHG | HEART RATE: 98 BPM | SYSTOLIC BLOOD PRESSURE: 117 MMHG | HEIGHT: 62 IN | WEIGHT: 202 LBS

## 2023-06-07 DIAGNOSIS — H65.193 ACUTE EFFUSION OF BOTH MIDDLE EARS: ICD-10-CM

## 2023-06-07 DIAGNOSIS — J06.9 VIRAL URI: Primary | ICD-10-CM

## 2023-06-07 DIAGNOSIS — R11.0 NAUSEA: ICD-10-CM

## 2023-06-07 LAB
CTP QC/QA: YES
CTP QC/QA: YES
MOLECULAR STREP A: NEGATIVE
SARS-COV-2 AG RESP QL IA.RAPID: NEGATIVE

## 2023-06-07 PROCEDURE — 99214 PR OFFICE/OUTPT VISIT, EST, LEVL IV, 30-39 MIN: ICD-10-PCS | Mod: S$GLB,,, | Performed by: PHYSICIAN ASSISTANT

## 2023-06-07 PROCEDURE — 87811 SARS CORONAVIRUS 2 ANTIGEN POCT, MANUAL READ: ICD-10-PCS | Mod: QW,S$GLB,, | Performed by: PHYSICIAN ASSISTANT

## 2023-06-07 PROCEDURE — 87811 SARS-COV-2 COVID19 W/OPTIC: CPT | Mod: QW,S$GLB,, | Performed by: PHYSICIAN ASSISTANT

## 2023-06-07 PROCEDURE — 87651 POCT STREP A MOLECULAR: ICD-10-PCS | Mod: QW,S$GLB,, | Performed by: PHYSICIAN ASSISTANT

## 2023-06-07 PROCEDURE — 99214 OFFICE O/P EST MOD 30 MIN: CPT | Mod: S$GLB,,, | Performed by: PHYSICIAN ASSISTANT

## 2023-06-07 PROCEDURE — 87651 STREP A DNA AMP PROBE: CPT | Mod: QW,S$GLB,, | Performed by: PHYSICIAN ASSISTANT

## 2023-06-07 RX ORDER — ONDANSETRON HYDROCHLORIDE 8 MG/1
8 TABLET, FILM COATED ORAL EVERY 8 HOURS PRN
Qty: 9 TABLET | Refills: 0 | Status: SHIPPED | OUTPATIENT
Start: 2023-06-07 | End: 2023-06-10

## 2023-06-07 RX ORDER — TRETINOIN 1 MG/G
CREAM TOPICAL NIGHTLY PRN
COMMUNITY
Start: 2023-02-25

## 2023-06-07 RX ORDER — CLINDAMYCIN PHOSPHATE 10 MG/G
GEL TOPICAL EVERY MORNING
COMMUNITY
Start: 2023-03-31

## 2023-06-07 NOTE — PROGRESS NOTES
"Subjective:      Patient ID: Yulissa Carias is a 27 y.o. female.    Vitals:  height is 5' 2" (1.575 m) and weight is 91.6 kg (202 lb). Her temperature is 98.6 °F (37 °C). Her blood pressure is 117/82 and her pulse is 98. Her respiration is 20 and oxygen saturation is 98%.     Chief Complaint: Nasal Congestion (Entered by patient)    27 year old female presents today with a fever of 100 at home, abd pain, nausea, anorexia, body aches, chills, sweats, nasal congestion, post nasal drip, hurts to swallow, HA, earache of right ear. Treatments at home include Tylenol. No known exposure to anything. Positive COVID 12/2021. COVID vaccinated. Symptoms started 06/05/2023    Sore Throat   This is a new problem. The current episode started in the past 7 days. The problem has been unchanged. The maximum temperature recorded prior to her arrival was 100.4 - 100.9 F. The pain is at a severity of 4/10. The pain is moderate. Associated symptoms include abdominal pain, ear pain, headaches and swollen glands. Pertinent negatives include no congestion, coughing, diarrhea, drooling, ear discharge, hoarse voice, plugged ear sensation, neck pain, shortness of breath, stridor, trouble swallowing or vomiting. She has had no exposure to strep or mono. She has tried acetaminophen for the symptoms. The treatment provided mild relief.     Constitution: Positive for chills and fever. Negative for appetite change, sweating, fatigue and unexpected weight change.   HENT:  Positive for ear pain and sore throat. Negative for ear discharge, tinnitus, hearing loss, dental problem, drooling, mouth sores, tongue pain, tongue lesion, congestion, postnasal drip, sinus pain, sinus pressure, trouble swallowing and voice change.    Neck: Negative for neck pain and neck stiffness.   Cardiovascular:  Negative for chest pain and sob on exertion.   Eyes:  Negative for eye discharge and eye itching.   Respiratory:  Negative for cough, shortness of breath and " stridor.    Gastrointestinal:  Positive for abdominal pain and nausea. Negative for vomiting, constipation, diarrhea and heartburn.   Genitourinary:  Negative for dysuria, frequency and urgency.   Musculoskeletal:  Positive for pain and muscle ache. Negative for trauma and muscle cramps.   Skin:  Negative for color change, pale and rash.   Neurological:  Positive for headaches. Negative for dizziness and altered mental status.   Psychiatric/Behavioral:  Negative for altered mental status.    Past Medical History:   Diagnosis Date    Allergy     Seasonal     Asthma     Biliary dyskinesia Jan 2014    [place]io 1[place]10[place] [facility] [place] [facility] [place]    IBS (irritable bowel syndrome) 2017       Past Surgical History:   Procedure Laterality Date    CHOLECYSTECTOMY      lap rubio  1/10/14    [place]. [place] [facility] [place] [facility] [place] Biliary dyskinesia/chronic cholecystitis       Family History   Problem Relation Age of Onset    Hypertension Mother     Diabetes Mother         prediabetes    Hyperlipidemia Maternal Grandmother     Hypertension Paternal Grandfather     Celiac disease Maternal Aunt     Sinus disease Father     Anxiety disorder Father     Acne Neg Hx     Melanoma Neg Hx     Psoriasis Neg Hx     Lupus Neg Hx     Eczema Neg Hx     Breast cancer Neg Hx     Colon cancer Neg Hx     Ovarian cancer Neg Hx        Social History     Socioeconomic History    Marital status: Single   Occupational History    Occupation: Student      Comment: [employer]   Tobacco Use    Smoking status: Never    Smokeless tobacco: Never   Substance and Sexual Activity    Alcohol use: No     Alcohol/week: 0.0 standard drinks     Comment: occasionally    Drug use: No    Sexual activity: Never     Birth control/protection: OCP   Other Topics Concern    Are you pregnant or think you may be? No    Breast-feeding No   Social History Narrative    Goes [employer]           Current Outpatient Medications   Medication Sig Dispense Refill     EScitalopram oxalate (LEXAPRO) 20 MG tablet Take 1 tablet (20 mg total) by mouth once daily. 30 tablet 6    multivitamin capsule Take 1 capsule by mouth once daily.      clindamycin phosphate 1% (CLINDAGEL) 1 % gel Apply topically every morning.      cyanocobalamin 500 MCG tablet Take 500 mcg by mouth once daily.      sumatriptan (IMITREX) 50 MG tablet Take 1 tablet (50 mg total) by mouth once. 1 tablet at onset of migraine; may repeat in 2 hours.  Not more than 2 tablets in 24 hours. for 1 dose 30 tablet 0    tretinoin (RETIN-A) 0.1 % cream Apply topically nightly as needed.       No current facility-administered medications for this visit.       Review of patient's allergies indicates:  No Known Allergies     Objective:     Physical Exam   Constitutional: She is oriented to person, place, and time. She appears well-developed. She is cooperative.  Non-toxic appearance. She does not appear ill. No distress.   HENT:   Head: Normocephalic and atraumatic.   Ears:   Right Ear: Hearing, external ear and ear canal normal. Tympanic membrane is not injected, not erythematous, not retracted and not bulging. A middle ear effusion is present. impacted cerumen  Left Ear: Hearing, external ear and ear canal normal. Tympanic membrane is not injected, not erythematous, not retracted and not bulging. A middle ear effusion is present. impacted cerumen  Nose: Nose normal. No mucosal edema, rhinorrhea, nasal deformity or congestion. No epistaxis. Right sinus exhibits no maxillary sinus tenderness and no frontal sinus tenderness. Left sinus exhibits no maxillary sinus tenderness and no frontal sinus tenderness.   Mouth/Throat: Uvula is midline and mucous membranes are normal. Mucous membranes are moist. No trismus in the jaw. Normal dentition. No uvula swelling. Posterior oropharyngeal erythema present. No oropharyngeal exudate or posterior oropharyngeal edema.   Eyes: Conjunctivae and lids are normal. Right eye exhibits no discharge.  Left eye exhibits no discharge. No scleral icterus.   Neck: Trachea normal and phonation normal. Neck supple. No edema present. No erythema present. No neck rigidity present.   Cardiovascular: Normal rate, regular rhythm, normal heart sounds and normal pulses.   No murmur heard.Exam reveals no gallop.   Pulmonary/Chest: Effort normal and breath sounds normal. No stridor. No respiratory distress. She has no decreased breath sounds. She has no wheezes. She has no rhonchi. She has no rales.   Abdominal: Normal appearance and bowel sounds are normal. She exhibits no distension and no mass. Soft. flat abdomen There is no abdominal tenderness. There is no rebound and no guarding. No hernia.   Musculoskeletal:      Cervical back: She exhibits tenderness.   Lymphadenopathy:     She has cervical adenopathy.   Neurological: no focal deficit. She is alert and oriented to person, place, and time. She displays no weakness. She exhibits normal muscle tone. Coordination normal.   Skin: Skin is warm, dry, intact, not diaphoretic, not pale and no rash.   Psychiatric: Her speech is normal and behavior is normal. Mood, judgment and thought content normal.   Nursing note and vitals reviewed.  Results for orders placed or performed in visit on 06/07/23   POCT Strep A, Molecular   Result Value Ref Range    Molecular Strep A, POC Negative Negative     Acceptable Yes    SARS Coronavirus 2 Antigen, POCT Manual Read   Result Value Ref Range    SARS Coronavirus 2 Antigen Negative Negative     Acceptable Yes        Assessment:     1. Viral URI    2. Acute effusion of both middle ears    3. Nausea        Plan:       Viral URI  -     POCT Strep A, Molecular  -     SARS Coronavirus 2 Antigen, POCT Manual Read    Acute effusion of both middle ears    Nausea  -     ondansetron (ZOFRAN) 8 MG tablet; Take 1 tablet (8 mg total) by mouth every 8 (eight) hours as needed for Nausea.  Dispense: 9 tablet; Refill: 0    Results  "reviewed    I have reviewed the patient chart and pertinent past imaging/labs.  Patient Instructions                                                            URI   If your condition worsens or fails to improve we recommend that you receive another evaluation at the urgent care/ER immediately or contact your PCP to discuss your concerns. You must understand that you've received an urgent care treatment only and that you may be released before all your medical problems are known or treated. You the patient will arrange for follouwp care as instructed.   If we discussed that I think your illness is viral, it will not respond to antibiotics and will last 10-14 days.     Use zofran as needed for nausea.    Flonase (fluticasone) is a nasal spray which is available over the counter and may help with your symptoms.   Zyrtec D, Claritin D or Allegra D can also help with symptoms of congestion and drainage.   If you have hypertension avoid using the "D" which is the decongestant   If you just have drainage you can take plain zyrtec, claritin or allegra     Rest and fluids are also important.     Salt water gargles, warm tea with honey and chloraseptic spray as needed for sore throat.   Tylenol or ibuprofen can also be used as directed for pain unless you have an allergy to them or medical condition such as stomach ulcers, kidney or liver disease or blood thinners etc for which you should not be taking these type of medications.               Medical Decision Making:   History:   Old Records Summarized: other records.       <> Summary of Records: Labs and ekg   Initial Assessment:   Viral uri  Differential Diagnosis:   Strep, covid, seasonal allergies  Clinical Tests:   Lab Tests: Ordered and Reviewed       <> Summary of Lab: Strep, covid  Urgent Care Management:  See above         "

## 2023-06-07 NOTE — PATIENT INSTRUCTIONS
"                                                         URI   If your condition worsens or fails to improve we recommend that you receive another evaluation at the urgent care/ER immediately or contact your PCP to discuss your concerns. You must understand that you've received an urgent care treatment only and that you may be released before all your medical problems are known or treated. You the patient will arrange for follouwp care as instructed.   If we discussed that I think your illness is viral, it will not respond to antibiotics and will last 10-14 days.     Use zofran as needed for nausea.    Flonase (fluticasone) is a nasal spray which is available over the counter and may help with your symptoms.   Zyrtec D, Claritin D or Allegra D can also help with symptoms of congestion and drainage.   If you have hypertension avoid using the "D" which is the decongestant   If you just have drainage you can take plain zyrtec, claritin or allegra     Rest and fluids are also important.     Salt water gargles, warm tea with honey and chloraseptic spray as needed for sore throat.   Tylenol or ibuprofen can also be used as directed for pain unless you have an allergy to them or medical condition such as stomach ulcers, kidney or liver disease or blood thinners etc for which you should not be taking these type of medications.         "